# Patient Record
Sex: FEMALE | Race: WHITE | NOT HISPANIC OR LATINO | Employment: OTHER | URBAN - METROPOLITAN AREA
[De-identification: names, ages, dates, MRNs, and addresses within clinical notes are randomized per-mention and may not be internally consistent; named-entity substitution may affect disease eponyms.]

---

## 2017-01-05 DIAGNOSIS — G12.21 ALS (AMYOTROPHIC LATERAL SCLEROSIS): Primary | ICD-10-CM

## 2017-01-10 DIAGNOSIS — G12.21 ALS (AMYOTROPHIC LATERAL SCLEROSIS): Primary | ICD-10-CM

## 2017-01-11 DIAGNOSIS — G12.21 ALS (AMYOTROPHIC LATERAL SCLEROSIS): Primary | ICD-10-CM

## 2017-01-18 ENCOUNTER — OFFICE VISIT (OUTPATIENT)
Dept: NEUROLOGY | Facility: CLINIC | Age: 82
End: 2017-01-18
Payer: MEDICARE

## 2017-01-18 VITALS — HEIGHT: 65 IN | OXYGEN SATURATION: 98 %

## 2017-01-18 DIAGNOSIS — M41.9 SCOLIOSIS OF THORACIC SPINE, UNSPECIFIED SCOLIOSIS TYPE: ICD-10-CM

## 2017-01-18 DIAGNOSIS — Z78.9 IMPAIRED MOBILITY AND ADLS: ICD-10-CM

## 2017-01-18 DIAGNOSIS — Z00.8 NUTRITIONAL ASSESSMENT: ICD-10-CM

## 2017-01-18 DIAGNOSIS — G12.21 ALS (AMYOTROPHIC LATERAL SCLEROSIS): Primary | ICD-10-CM

## 2017-01-18 DIAGNOSIS — M54.50 CHRONIC MIDLINE LOW BACK PAIN WITHOUT SCIATICA: ICD-10-CM

## 2017-01-18 DIAGNOSIS — G89.29 CHRONIC MIDLINE LOW BACK PAIN WITHOUT SCIATICA: ICD-10-CM

## 2017-01-18 DIAGNOSIS — Z74.09 IMPAIRED MOBILITY AND ADLS: ICD-10-CM

## 2017-01-18 DIAGNOSIS — R09.82 POSTNASAL DRIP: ICD-10-CM

## 2017-01-18 PROCEDURE — 99212 OFFICE O/P EST SF 10 MIN: CPT

## 2017-01-18 PROCEDURE — 99213 OFFICE O/P EST LOW 20 MIN: CPT | Mod: S$PBB,,, | Performed by: PHYSICAL MEDICINE & REHABILITATION

## 2017-01-18 PROCEDURE — 92522 EVALUATE SPEECH PRODUCTION: CPT | Mod: PO

## 2017-01-18 PROCEDURE — G9186 MOTOR SPEECH GOAL STATUS: HCPCS | Mod: CI,PO

## 2017-01-18 PROCEDURE — 99215 OFFICE O/P EST HI 40 MIN: CPT | Mod: S$PBB,,, | Performed by: PSYCHIATRY & NEUROLOGY

## 2017-01-18 PROCEDURE — G9158 MOTOR SPEECH D/C STATUS: HCPCS | Mod: CI,PO

## 2017-01-18 PROCEDURE — G8999 MOTOR SPEECH CURRENT STATUS: HCPCS | Mod: CI,PO

## 2017-01-18 PROCEDURE — 99213 OFFICE O/P EST LOW 20 MIN: CPT | Mod: S$PBB,,, | Performed by: INTERNAL MEDICINE

## 2017-01-18 NOTE — PROGRESS NOTES
Physical Therapy Screen/Assessment at ALS clinic   Time In: 9:25AM  Time out: 9:40AM     Jaelyn is a 81 y.o. female that presents to [facility] secondary to dx of ALS. Pt was first diagnosed approximately 7 months ago, symptom onset > 1 year ago.      This patient is currently receiving home health PT so a new PT evaluation was not completed today. This is the 1st time seen at this ALS clinic. Pt's daughter Olga was present.     Pain: chronic back and LE pain. No rating provided.     Current functional level:  Mrs. Edmonds reports that her legs feel weaker and she is not able to walk unassisted anymore. She is only able to take a few steps even with assistance. She is being evaluated for a custom power WC by OT today. At home she is using a transport WC, not one she can propel on her own. She is using a BSC for toileting. Has sitters at night and home health to assist with bathing 3x/week. I recommend a sliding board to assist with transfer safety as she continues to get weaker. HHPT can train on the use.     Home setup: Lives with  who has his own medical deficits. 2 story home but bedroom, bath and all necessities on first level with 1 small step to enter.  Is planned to move into an assisted living facility in March.     Equipment/DME: Rollator, transport WC, BSC and tub bench for bathing.      Falls: None since last clinic.     Edema: B LEs, manages with elevation throughout the day.     Sensation: Intact    Tone: decreased B LE, R>L      Sitting balance static: good  Sitting balance dynamic: fair  Standing balance static: poor  Standing balance dynamic:  poor    Lower Extremity Strength   Right LE  Left LE    Hip flexion: 2/5 Hip flexion: 3/5   Hip abduction 2/5 Hip abduction 3/5   Hip adduction 2/5 Hip adduction 3/5   Knee extension: 2/5 Knee extension: 3/5   Knee flexion: 2/5 Knee flexion: 3/5   Ankle dorsiflexion: trace Ankle dorsiflexion: 2/5   Ankle PF 2/5 Ankle PF  3/5     Lower Extremity ROM: Passive WNL    HEP/Activity levels: Pt is working with a home health PT on HEP for AROM and PROM.     Recommendations:   Continue Home health PT.   Sliding board for transfers.  Patient will greatly benefit from a power WC - process initiated by OT today.     Rocío Guerrero, PT  01/18/2017

## 2017-01-18 NOTE — PROGRESS NOTES
CHIEF COMPLAINT:  OT Progress Note (w/c eval)    HISTORY OF PRESENT ILLNESS: Jaelyn Edmonds is a 81 y.o. female with ALS, history of breast cancer with local radiation, HTN who returns for followup of her motor neuron disease for evaluation of respiratory status. No infections or complications since her last visit. Notes progressive weakness of left leg (dz began in right leg). Taking cetirizine, nasal fluticasone, montelukast with some persistent sensation of post nasal drip/ congestion.     PAST MEDICAL HISTORY:    Past Medical History   Diagnosis Date    ALS (amyotrophic lateral sclerosis)     Breast cancer 2011     Breast cancer - lumpectomy, radiation, no chemo; L    Hypertension     Hypothyroid        PAST SURGICAL HISTORY:    Past Surgical History   Procedure Laterality Date    Spine surgery  2015     L4-5 laminectomy       FAMILY HISTORY:                Family History   Problem Relation Age of Onset    Cancer Mother     Cancer Father     Cancer Sister     Cancer Daughter        SOCIAL HISTORY:          Social support: Lives in [place]. presents with daughter Olga and close friend Megan (shon godmother-son had cervical diving injury recently) (another daughter Kerrie at last visit to [employer]). Her sister lives nearby.  with active illnesses- CAD stents recently.  Planning assisted living.  History   Smoking Status    Former Smoker    Years: 5.00   Smokeless Tobacco    Not on file       ALLERGIES:  Review of patient's allergies indicates:  No Known Allergies    CURRENT MEDICATIONS:    Current Outpatient Prescriptions   Medication Sig Dispense Refill    alprazolam (XANAX) 0.25 MG tablet Take 1 tablet (0.25 mg total) by mouth 2 (two) times daily as needed for Anxiety. 90 tablet 3    ARMOUR THYROID 30 mg Tab Take 30 mg by mouth once daily.  0    cetirizine (ZYRTEC) 10 MG tablet Take 1 tablet (10 mg total) by mouth once daily. 90 tablet 11    cyanocobalamin, vitamin B-12, 1,000 mcg/mL  Kit Inject 1,000 mcg as directed as directed. Inject 1000 mcg twice weekly 8 kit 5    lisinopril (PRINIVIL,ZESTRIL) 40 MG tablet Take 1 tablet (40 mg total) by mouth once daily. 90 tablet 11    magnesium 30 mg Tab Take by mouth.      montelukast (SINGULAIR) 10 mg tablet Take 1 tablet (10 mg total) by mouth after dinner. 90 tablet 11    multivitamin capsule Take 4 capsules by mouth once daily.      tramadol (ULTRAM) 50 mg tablet Take 1 tablet by mouth 3 times daily as needed for Pain. 90 tablet 1    triamterene-hydrochlorothiazide 37.5-25 mg (MAXZIDE-25) 37.5-25 mg per tablet Take 1 tablet by mouth once daily. 90 tablet 11    TURMERIC (CURCUMIN MISC) by Misc.(Non-Drug; Combo Route) route once daily.       No current facility-administered medications for this visit.                   REVIEW OF SYSTEMS:   Pulmonary related symptoms as per HPI.  Gen: no weight loss, no fever, no night sweats  HEENT: mild sinus congestion, no dysphagia, intermittent hoarseness  CV: no chest pain, no orthopnea (uses 2 pillows chronically for congestion), no paroxysmal nocturnal dyspnea  Neuro: no syncope, no falls, using walker with seat.  Sleep: No snoring; no witnessed apnea.         PHYSICAL EXAM:   There were no vitals filed for this visit.  RR 12  GENERAL: Alert, calm, in no distress  HEENT: Normal pupils, normal conjunctiva, normal EOM, nasal and oral mucosa normal, tongue normal  NECK: supple; no palpable lymphadenopathy or masses,no jugular venous distention.  CVS: regular rate and rhythm, no murmers, gallops or rubs  PULM: Grossly normal vital capacity, normal to percussion and palpation, clear to auscultation bilaterally with no wheezes, crackles or rhonchi, no accessory muscle usage.  ABDOMEN: soft nontender/nondistended,normal rise with inspiration, decreased contraction with cough  EXTREMITIES no cyanosis, clubbing, tr edema    CONTRIBUTORY STUDIES:     PULMONARY FUNCTION TESTS: FVC 90%; O2 sat 98%;   l/min      ACTIVE PULMONARY PROBLEMS:    ICD-10-CM ICD-9-CM    1. ALS (amyotrophic lateral sclerosis) G12.21 335.20    2. Impaired mobility and ADLs Z74.09 799.89    3. Chronic midline low back pain without sciatica M54.5 724.2     G89.29 338.29    4. Scoliosis of thoracic spine, unspecified scoliosis type M41.9 737.30        ASSESSMENT&PLAN:  1.  Persistent congestion / exacerbation- prn diphenhydramine at bedtime  2.  Inspiratory muscle strength intact  3.  Cough efficacy decreased by abdominal weakness.  Will prescribe cough assist machine.  Reviewed literature on exp muscle training- does not improve voluntary cough peak flow.      No Follow-up on file.      [Greater than 50% of this 25 minute visit spent counseling patient and family]

## 2017-01-18 NOTE — PROGRESS NOTES
Jaelyn Edmonds  [id number]  Shmuel    Diagnosis: ALS with impaired mobility, scoliosis, low back pain and B leg pain     Onset date: greater than 1 year ago but diagnosed 7 months ago.   Date of service: 1/18/17    Orders:  Power wheelchair eval    Subjective:  Patient goals: Safe and efficient use of a wheelchair for mobility in home and to preserve energy for daily life tasks. Prevention of skin breakdown.  Chief Complaint   Patient presents with    OT Progress Note     w/c eval         Past Medical History   Diagnosis Date    ALS (amyotrophic lateral sclerosis)     Breast cancer 2011     Breast cancer - lumpectomy, radiation, no chemo; L    Hypertension     Hypothyroid        Review of patient's allergies indicates:  No Known Allergies    Precautions: fall, fatigue  Social Hx: lives with  with caregivers in two story home with bed and bath on first floor. Pt. Is planning on moving to Assisted living facility in [place] at the end of March.     DME: transport w/c, rollator, rolling walker, BSC, and tub bench.  Home access: 1 step.     Past treatment includes: HH PT    Pain: back pain due to scoliosis. Pt. Described as chronic with no rating given.    Relieved by: medication    Dominant hand: right    Occupation/hobbies/homemaking: passive lifestyle. Tried to jovani recently and could not.    Driving status: D  Objective  Cognitive Exam  Oriented: person, place and time,  anxiety  Visual/perceptual: WNL    Physical Exam:  AROM/PROM,Strength and Coordination:    UE Pt. Has severe UE weakness. AROM WFL but fair plus strength, interossei atrophy B hands but R worse than L.  39lb R and 47 lb L.     LE  Poor 2/5 strength RLE and Fair plus 3 + in LLE. B foot drop present. No limits in PROM except dorsiflexion which is to neutral only. Moderate dependent edema in B feet with discoloration when not elevated.   Tone: Normal  Sensation: normal    Posture:   Trunk: sits in posterior pelvic tilt with  difficulty maintaining neutral pelvis. Severe scoliosis  Concave to L with slight R rib hump.   Head WNL    Skin integrity: WNL      Pt. Is in wheelchair 6 hours a day. Jaelyn Edmonds  Is not able to do pressure reliefs due to decreased arm and leg strength, back pain and compromised sit balance. Pt. Is at risk for prolonged sit. Sensation is intact and pt. Skin is intact.   Tone:  Modified Yakov Scale:   WNL  Balance:   Static Sit: weak trunk, pt. Sits in posterior tilt with dorsal lean.   Dynamic sit: poor, pt. Cannot safely lean forward and sideways.   Static Stand: m od a  Dynamic stand: KASEY Salinas, ATP from [employer] performed seating measures in my presence.  Functional Status:    Functional Mobility:  Bed mobility: min a  Roll to left:min a  Roll to right:  Min a  Supine to sit:  Min a  Sit to supine: min a  Transfers to bed: mod a -5 step to BSC with assistance of walker and 1 person.   Transfers to toilet: see t/f to bed. Same to t/f to shower bench.   Car transfers: Mod a  Wheelchair mobility: currently using transport w/c which does not provide independence in mobility or adequate trunk support.     ADL's:  Feeding: I  Grooming: mod I  Hygiene:mod I   UB Dressing: min a  LB Dressing: mod a  Toileting:  Mod a  Bathing:max a    IADL's:  Homecare: D  Cooking: D  Laundry: D  Yard work: n/a  Use of telephone:min a  Money management: min a  Medication management: min a  LEFS score: 4/80 or 95% impaired mobility    TODAY'S TREATMENT  Pt. evaluated for a power tilt and recline wheelchair with power elevating legs, power elevation,  protective cushion, and trunk support for independent mobility in the home using a alana stick  Power tilt and protective cushion- for pressure relief as she does not have UE, LE or trunk support to do pressure releases  Power recline - to change back/hip angle for decreased back pain due to decreased trunk strength  Power elevating legs - due to dependent edema in  BLE  Power elevate- to help with transfers to a variety of surfaces, to access sinks and countertops, and for social interactions.   Trunk support due to scoliosis and increased C curve with trunk weakness due to ALS.     ASSESSMENT:  OT diagnosis: Impaired ADL and mobility due to ALS, scoliosis and back pain  Assessment  Jaelyn Edmonds is a 81 y.o. with a medical diagnosis of ALS   referred to occupational therapy for power wheelchair postioning.     Patient has mobility limitation that significantly impairs safe, timely, consistent participation in one or more MRADL. Yes  A mobility assistive device will effectively improve ability to participate in or aid participation in MRADL's.  Yes   A cane or walker will provide patient the ability to safely and consistently perform MRADLs in a timely manner  no  The patient's home environment will support use of recommended mobility device. Yes   The patient has sufficient UE strength to effectively self propel a manual wheelchair for al MRADL's.  no  The patient has sufficient upper extremity strength, , transfer ability and trunk stability to safely operate a POV(scooter).  no  The additional benefits of a power wheelchair will more effectively enable MRADL's in home. Yes   The patient demonstrates ability/potential ability to use recommended equipment. Yes   The patient is willing and motivated to use the recommended equipment. Yes     Recommended Wheelchair will meet the following goals:  Improve mobility  Improve postural alignment  Decrease chance of injury;Improve safety  Decrease stress on pulmonary system  Improved endurance as a result of upright and midline positioning.  Provide trunk support.  Facilitate I in self care/home care.  Decrease pressure wounds or pressure on ischium/coccyx.    PLAN:   Patient/caregiver understands and agrees with plan of care.  OT to work with vendor, KARYN Womack from [employer] to obtain recommended wheelchair.

## 2017-01-18 NOTE — PROGRESS NOTES
Date: 01/18/2017    Start Time:  11:40  Stop Time:  12:00      ALS Clinic: 2      OUTPATIENT NEUROLOGICAL REHABILITATION  SPEECH THERAPY EVALUATION    HISTORY AND SUBJECTIVE    Onset Date:  Diagnosis 4 months ago. Symptoms about 1 year ago.  Primary Diagnosis:  ALS  Treatment Diagnosis:  Mild dysarthria and pharyngeal dysphagia  Referring Provider: Dr. Mi  Orders: Ambulatory referral for ST evaluate and treat  Past Medical History:   Past Medical History   Diagnosis Date    ALS (amyotrophic lateral sclerosis)     Breast cancer 2011     Breast cancer - lumpectomy, radiation, no chemo; L    Hypertension     Hypothyroid      History of Present Illness: Mrs. Edmonds presents to the [facility] secondary to the diagnosis of ALS Disease.   Functional Deficits Leading to Referral/Nature of Injury:  Hoarse vocal quality, mild decreased speech difficulty, and swallowing difficulty  Precautions:  Swallowing precautions   Prior Therapy:  Home Health speech therapy a few months for evaluation only. Currently receiving Home Health PT and Home Health OT.  Pain:  4-5/10 (leg pain when walking/standing)  Nutrition:  Regular diet with thin liquids with head turn to right  Environmental Concerns/Cultural/Spiritual/Developmental/Educational Needs: None  Social History:  Mrs. Marquez was accompanied to the clinic by her daughter. She lives at home with her  and has sitters during the day. They will be moving to an Assisted Living Facility in March.  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    OBJECTIVE  Current Assessment:   Auditory Comprehension: Not formally assessed. Appeared to be within functional limits for conversation.    Reading Comprehension: Not formally assessed. No concerns reported.    Verbal Expression: Not formally assessed. Appeared to be within functional limits for conversation. No word finding difficulties were observed.     Written Expression: Not formally  "assessed.    Cognition: Not formally assessed. Appeared to be within functional limits.    Motor Speech/Fluency/Voice: Oral motor exam revealed: strength and range were observed to be adequate for labial, lingual, mandibular, and buccal functions. Mild air escape when buccal muscles were expanded (puffed out) and mild tremors noted on tongue. Velum elevation was reduced for sustained and alternating elevation. Diadochokinetic (DDK) rates for rapid repetition of 1 - 3 syllable utterances were mildly reduced and articulation was mildly imprecise and uncoordinated. She sustained a vowel for an average of 17 seconds, with the norm being 15-25 seconds for her age and sex. Vocal quality was hoarse during the session even more so toward the end. Mrs. Edmonds and her daughter reported that her voice becomes hoarse with fatigue and extended use to the point of a whisper. Vocal intensity was low. Fluency was within normal limits. Mrs. Edmonds was intelligible most of the time during the evaluation.     NOTE: She was seen by Dr. Case, Otolaryngologist, on 9/25/16 and reported "mild allergic rhinitis, trace vocal fold atrophy, trace laryngeal tremor, trace muscle tension dysphonia, and impaired pharyngeal biomechanics with diminished sensation and weak pharyngeal squeeze."    Augmentative/Alternative Communication (AAC): Not warranted at this time. Declined voice banking. It was suggested that she use the Text to Speak communication ross and/or a voice amplifier when her vocal intensity and strength decline. Team Shree gave her a voice amplifier to use but she has not used it yet.     Swallowing: No concerns were reported today for regular consistency diet and thin liquids.    NOTE: Mrs. Edmonds participated in a Modified Barium Swallow Study (MBSS) on 08/10/16. See report for details. MBSS results indicated pharyngeal dysphagia on thin liquids. It was recommended that Mrs. Edmonds eat a regular consistency diet with thin " liquids given a right head turn. She reported no difficulty following swallowing precautions with current diet. She reported having difficulty swallowing large pills and needing large gulps of liquids to swallow with her head tilted back. She was educated to keep her head in an upright position and turned to the right while swallowing solids and liquids for further protection of airway.     Hearing: Not formally assessed. Appeared to be within functional limits for conversational speech.    Education: Mrs. Edmonds and her daughter were educated on recommendations, swallowing precautions, use of voice amplifier, communication ross (test to speak) and speech compensatory strategies. They verbalized understanding and agreed with the plan of care    ASSESSMENT  Impressions: Mrs. Edmonds exhibited mild dysarthria and pharyngeal dysphagia secondary to her diagnosis of ALS. Her deficits were characterized by mild oral motor weakness, reduced velar elevation, hoarse vocal quality, and low vocal intensity. She and her daughter reported increased hoarseness and consonant imprecision with fatigued. Mrs. Edmonds currently eats a regular consistency diet with thin liquids given a right head turn. No swallowing concerns were reported on her current diet. Some difficulty reported with large pills. No further speech therapy services are recommended at this time. Follow up with [facility] in three months.     Functional Communication Measure (FCM):   Severity Modifier for Medicare G-Code:  Motor Speech  Current status: FCM: Level 6   - CI  at least 1% but less than 20% impaired, limited or restricted  Projected status:  FCM:  Level 6    - CI at least 1% but less than 20% impaired, limited or restricted  Discharge status:  FCM:  Level 6   - CI at least 1% but less than 20% impaired, limited or restricted    [employer] National Outcome Measurement System (NOMS) and Functional Communication Measure (FCM):   Severity  Modifier for Medicare G-Code:  Swallowing  Current status:  FCM:  Level 6   - CI at least 1% but less than 20% impaired, limited or restricted  Projected status:  FCM: Level 6     - CI at least 1% but less than 20% impaired, limited or restricted  Discharge status:  FCM:  Level 6   -  CI at least 1% but less than 20% impaired, limited or restricted    PLAN  Recommended Treatment Plan: No speech therapy services at this time. Follow up with [facility] in 3 months.     Other Recommendations: None      DANIEL Lundberg, CCC-SLP  Speech-Language Pathologist  Outpatient Neurological Rehabilitation    Date: 01/18/2017

## 2017-01-18 NOTE — PROGRESS NOTES
"  Subjective:       Patient ID: Jaelyn Edmonds is a 81 y.o. female.    Chief Complaint:  ALS     History of Present Illness  HPI 1/18/2017  Jaelyn Edmonds is a 82 yo female with ALS, dx 7/2016, onset of right leg weakness 6/2015. Presents today for evaluation and mobility exam.  Pt was recently seen in neurology clinic by Dr. Mi and myself, see note below. She notes no major changes since seen in Neurology clinic. Power wheelchair eval done today. Continues to complain of chronic sore throat. Has some difficulty transferring from bed to wheelchair. She has plans to move into an assisted living facility in March with her .      Interval hx 12/20/16:  Jaelyn Edmonds is a 82 yo female with ALS dx 7/2016, onset of right leg weakness 6/2015. She presents today for a power wheelchair evaluation/mobility exam and to discuss some changes which have occurred in the interval since she was last seen.     She notes her lower extremities have progressively gotten weaker. She has a difficult time ambulating with the walker, mostly using a wheelchair. She has difficulty independently performing all ADLs. Currently getting home health PT twice weekly.     Also notes a slight change in breathing with exhaling. Denies sob or orthopnea. She notes hoarseness of her voice when tired. Pt has allergies, on Zyrtec and Flonase- notes chronic throat soreness when swallowing. She denies difficulty chewing or swallowing.       She is also here to discuss her future wishes and plans. She has the [employer] Wishes document which she will bring to us at ALS clinic next month. States she does not want a tracheostomy. She would like her body to be donated to science then later cremated. Her daughter, Olga, is her power of  and is aware of her mother's wishes.         Review of Systems  Review of Systems    Objective:     Visit Vitals    Ht 5' 5" (1.651 m)    SpO2 98%        Neurologic Exam    Physical Exam    ALS " Physical Exam PBA Speech Facial Strength Tongue Strength Tongue Appear GPS Jaw Jerk   1/18/2017 No normal normal normal fasic;normal No No      ALS Physical Exam (Continued) Limb Fasciculations Neck Flex HHD Neck Flex MMT Neck Ext HHD Neck Ext MMT Shd Abd R HHD Shd Abd R MMT   1/18/2017 Present 16.5 5 25 5 18.9 5      ALS Physical Exam (Continued) Shd Abd L HHD Shd Abd L MMT Wrist Ext R HHD Wrist Ext R MMT Wrist Ext L HHD Wrist Ext L MMT Hand  R (kg)   1/18/2017 15.6 4+ 14.8 5 14.5 5 18      ALS Physical Exam (Continued) Hand  L (kg) Hip Flex R HHD Hip Flex R MMT Hip Flex L HHD Hip Flex L MMT Knee Ext R HHD Knee Ext R MMT   1/18/2017 20 11.9 3 19 4 28.3 4+      ALS Physical Exam (Continued) Knee Ext L HHD Knee Ext L MMT Foot DF R HHD Foot DF R MMT Foot DF L HHD Foot DF L MMT UMN Signs Legs   1/18/2017 23.8 4+ 0 0 0 0 Yes      ALS Physical Exam (Continued) UMN Signs Legs Type   1/18/2017 increased dtr       Cognitive Score:  18   ALSFRS Score:  35   FRS-6 Score:  15                   Impression:        1. ALS (amyotrophic lateral sclerosis)    2. Impaired mobility and ADLs    3. Chronic midline low back pain without sciatica    4. Scoliosis of thoracic spine, unspecified scoliosis type    5. Postnasal drip    6. Nutritional assessment        Recommendations:     -This patient requires a power wheelchair for safe ambulation due to progressive upper and lower extremity weakness. Pt has progressive ambulation difficulties and is at risk for falls. Pt is unable to safely ambulate with a walker due to progressive upper extremity weakness.   -refer to PT/OT for face to face assessment for power wheelchair evaluation  -cough assist  -sliding board   -chronic sore throat- can do salt water gargles vs OTC Phenol (Ulcerease) rinse   -RTC 3 mos       I have personally taken the history and examined the patient and agree with the PA's note as stated above.    Cesario Mi MD, AMINA, FAAN  Department of Neurology    [facility] [place], [place]

## 2017-01-18 NOTE — PROGRESS NOTES
GRUPO met with pt, pt's dtr Olga and pt's friend, Vidya, on this date for pt's 2nd ALS clinic. Pt is a 80 y/o F with a primary dx of ALS. Pt states the info visit with Guardian Rupesh Rios went well and the hospice representative was able to answer all the family's questions. Pt inquired about brain and spinal cored donation- GRUPO provided them with the contact info of the [employer] at [facility] as well as all the forms they would need to complete in order to participate in their brain donation program. Pt also requested information on local cremation services which GRUPO will send to pt in the mail. Pt will be moving to an assisted living community in March. Pt has 3 sitters and aides that come in to her home to assist with ADLs and aides specifically top help with bathing 3x a week. GRUPO will continue to provide emotional support to pt and her family members and will connect them to community resources PRN.

## 2017-01-18 NOTE — PROGRESS NOTES
Subjective:       Patient ID: Jaelyn Edmonds is a 81 y.o. female.    Chief Complaint: OT Progress Note (w/c eval)    HPI     Ms. Edmonds is a 81-year-old white female with ALS diagnosed in July 2016, who   has followed up in the ALS Clinic.  Her past medical history is also significant   for hypertension, hypothyroidism, chronic low back pain secondary to DJD and   scoliosis.  Her last visit to the ALS Clinic was on 10/12/2016.  She was started   on meloxicam and p.r.n. tramadol for her back pain.    The patient is coming today to this clinic for followup.  Her back pain has been   generally under good control with occasional flare-up episodes.  It is an   intermittent aching pain in the lumbar spine and across her back.  She denies   any radiation into her legs.  Her maximum pain is 8-9/10 and minimum 1-2/10.    Today, it is 1-2/10.  The patient denies any bowel or bladder incontinence.  She   has some worsening of her lower extremity strength since last visit.    The patient was taking meloxicam, but had to stop it secondary to side effects   (abdominal pain, strange sensations in her tongue).  She continues to take   tramadol 50 mg p.r.n., usually one tablet three times per day.  She reports good   relief with its use.      MS/HN  dd: 01/18/2017 18:03:58 (CST)  td: 01/19/2017 08:35:03 (CST)  Doc ID   #[id number]  Job ID #[id number]    CC:           Review of Systems   Constitutional: Positive for fatigue.   Eyes: Negative for visual disturbance.   Respiratory: Negative for shortness of breath.    Cardiovascular: Negative for chest pain.   Gastrointestinal: Positive for constipation. Negative for nausea and vomiting.   Genitourinary: Positive for difficulty urinating.   Musculoskeletal: Positive for back pain and gait problem. Negative for arthralgias and neck pain.   Neurological: Positive for dizziness and headaches.   Psychiatric/Behavioral: Positive for sleep disturbance. Negative for behavioral problems.        Objective:      Physical Exam   Constitutional: She appears well-developed and well-nourished. No distress.   HENT:   Head: Normocephalic and atraumatic.   Neck: Normal range of motion.   Musculoskeletal:   BUE:  ROM:full.  Strength:    RUE: 5/5 at shoulder abduction, 5 elbow flexion, 5 elbow extension, 5 hand .   LUE: 5/5 at shoulder abduction, 5 elbow flexion, 5 elbow extension, 5 hand .  Sensation to pinprick:   RUE: intact.   LUE: intact.    BLE:  ROM:full.  Strength:    RLE: 3-/5 at hip flexion, 3 knee extension, 1 ankle DF, 3 PF.   LLE: 3/5 at hip flexion, 4 knee extension, 2 ankle DF, 3+PF.  Sensation to pinprick:     RLE: intact.      LLE: intact.     -ve tenderness over lumbar spine.         Neurological: She is alert.   Skin: Skin is warm.   Psychiatric: She has a normal mood and affect.   Vitals reviewed.      Assessment:       1. ALS (amyotrophic lateral sclerosis)    2. Impaired mobility and ADLs    3. Chronic midline low back pain without sciatica    4. Scoliosis of thoracic spine, unspecified scoliosis type        Plan:       - Continue tramadol (ULTRAM) 50 mg tablet; Take 1 tablet (50 mg total) by mouth 3 (three) times daily as needed for Pain.  - Follow up in ALS clinic.

## 2017-01-18 NOTE — PROGRESS NOTES
"Referring Physician:No ref. provider found     Reason for visit:  Chief Complaint   Patient presents with    OT Progress Note     w/c taeal    Nutrition Counseling        :1935     Allergies Reviewed  Meds Reviewed    Anthropometrics  Weight:   Height:5' 5" (1.651 m)  BMI:There is no height or weight on file to calculate BMI.   IBW:   56.8 kg    Meds:  Outpatient Medications Prior to Visit   Medication Sig Dispense Refill    alprazolam (XANAX) 0.25 MG tablet Take 1 tablet (0.25 mg total) by mouth 2 (two) times daily as needed for Anxiety. 90 tablet 3    ARMOUR THYROID 30 mg Tab Take 30 mg by mouth once daily.  0    cetirizine (ZYRTEC) 10 MG tablet Take 1 tablet (10 mg total) by mouth once daily. 90 tablet 11    cyanocobalamin, vitamin B-12, 1,000 mcg/mL Kit Inject 1,000 mcg as directed as directed. Inject 1000 mcg twice weekly 8 kit 5    lisinopril (PRINIVIL,ZESTRIL) 40 MG tablet Take 1 tablet (40 mg total) by mouth once daily. 90 tablet 11    magnesium 30 mg Tab Take by mouth.      montelukast (SINGULAIR) 10 mg tablet Take 1 tablet (10 mg total) by mouth after dinner. 90 tablet 11    multivitamin capsule Take 4 capsules by mouth once daily.      tramadol (ULTRAM) 50 mg tablet Take 1 tablet by mouth 3 times daily as needed for Pain. 90 tablet 1    triamterene-hydrochlorothiazide 37.5-25 mg (MAXZIDE-25) 37.5-25 mg per tablet Take 1 tablet by mouth once daily. 90 tablet 11    TURMERIC (CURCUMIN MISC) by Misc.(Non-Drug; Combo Route) route once daily.       No facility-administered medications prior to visit.          Labs:   N/A     Estimated Nutrition Needs:   1704 Kcals/day( 30 kcal/kg IBW),    46-56  g protein( 0.8-1.0 g/kg IBW)     Diet Hx: ALS Clinic - Initial Nutrition Assessment.  Seen today in conjunction with SLP.  Pt, her daughter and friend present for encounter.  Pt in clinic wheelchair; today's wt = 76.5 kg (pt + wc)  Pt's usual weight is 130 lbs.  Pt has help with grocery shopping and " meal preparation.  Eats 3 meals/day, plus one generic brand liquid nutrition supplement daily.  Appetite is fair; having some difficulty swallowing.     Assessment:   Note pt did not  copy of handouts she requested back in August; those were provided today.  Pt did not voice any nutrition questions/concerns today.    Nutrition Diagnosis: none at this time.    Recommendations:   Continue diet as tolerated.  Handouts provided:  High-Calorie, High-Protein Nutrition Therapy; Food Choices to Add Calories and Protein; Suggestions for Increasing Calories and Protein; High-Calorie, High-Protein Recipes; High-Calorie, High-Protein Shopping & Cooking Tips      Consultation Time:15 minutes.    Follow Up: Pt provided with dietitian contact number and advised to call with questions or make future appointment if further intervention needed.

## 2017-01-24 ENCOUNTER — TELEPHONE (OUTPATIENT)
Dept: NEUROLOGY | Facility: CLINIC | Age: 82
End: 2017-01-24

## 2017-01-24 DIAGNOSIS — G12.21 ALS (AMYOTROPHIC LATERAL SCLEROSIS): Primary | ICD-10-CM

## 2017-01-24 NOTE — TELEPHONE ENCOUNTER
----- Message from Felton Elder sent at 1/24/2017  1:55 PM CST -----  Contact: PT  States she need the information to the pulmonologist that was dicussed. She is having problems with her breathing, and would like a call today.    She states she is unable to find the information.     Please call: [phone]

## 2017-01-24 NOTE — TELEPHONE ENCOUNTER
Patient c/o bloating and difficulty exhaling. Cough assist delivered by [employer] on 1/23/17. Patient stated used device once on yesterday setting #2. Dr. Mi recommends additional training on proper use of Cough Assist. Nurse spoke with RT Fran of [employer], stated will contact patient and complete training visit.

## 2017-03-13 ENCOUNTER — PATIENT MESSAGE (OUTPATIENT)
Dept: NEUROLOGY | Facility: CLINIC | Age: 82
End: 2017-03-13

## 2017-03-14 DIAGNOSIS — G12.21 ALS (AMYOTROPHIC LATERAL SCLEROSIS): Primary | ICD-10-CM

## 2017-03-15 ENCOUNTER — OFFICE VISIT (OUTPATIENT)
Dept: NEUROLOGY | Facility: CLINIC | Age: 82
End: 2017-03-15
Payer: MEDICARE

## 2017-03-15 ENCOUNTER — LAB VISIT (OUTPATIENT)
Dept: LAB | Facility: HOSPITAL | Age: 82
End: 2017-03-15
Attending: PSYCHIATRY & NEUROLOGY
Payer: MEDICARE

## 2017-03-15 VITALS
HEIGHT: 65 IN | OXYGEN SATURATION: 100 % | BODY MASS INDEX: 41.54 KG/M2 | SYSTOLIC BLOOD PRESSURE: 112 MMHG | DIASTOLIC BLOOD PRESSURE: 66 MMHG | WEIGHT: 249.31 LBS

## 2017-03-15 DIAGNOSIS — G12.21 ALS (AMYOTROPHIC LATERAL SCLEROSIS): Primary | ICD-10-CM

## 2017-03-15 DIAGNOSIS — M54.50 CHRONIC MIDLINE LOW BACK PAIN WITHOUT SCIATICA: ICD-10-CM

## 2017-03-15 DIAGNOSIS — G12.21 ALS (AMYOTROPHIC LATERAL SCLEROSIS): ICD-10-CM

## 2017-03-15 DIAGNOSIS — R49.0 DYSPHONIA: ICD-10-CM

## 2017-03-15 DIAGNOSIS — Z74.09 IMPAIRED MOBILITY AND ADLS: ICD-10-CM

## 2017-03-15 DIAGNOSIS — G89.29 CHRONIC MIDLINE LOW BACK PAIN WITHOUT SCIATICA: ICD-10-CM

## 2017-03-15 DIAGNOSIS — R47.1 DYSARTHRIA: ICD-10-CM

## 2017-03-15 DIAGNOSIS — Z78.9 IMPAIRED MOBILITY AND ADLS: ICD-10-CM

## 2017-03-15 DIAGNOSIS — M41.9 SCOLIOSIS OF THORACIC SPINE, UNSPECIFIED SCOLIOSIS TYPE: ICD-10-CM

## 2017-03-15 DIAGNOSIS — R49.9 VOICE DISTURBANCE: ICD-10-CM

## 2017-03-15 DIAGNOSIS — R60.9 EDEMA, UNSPECIFIED TYPE: ICD-10-CM

## 2017-03-15 PROCEDURE — G9158 MOTOR SPEECH D/C STATUS: HCPCS | Mod: CI,PO

## 2017-03-15 PROCEDURE — 92522 EVALUATE SPEECH PRODUCTION: CPT | Mod: PO

## 2017-03-15 PROCEDURE — G8996 SWALLOW CURRENT STATUS: HCPCS | Mod: CI,PO

## 2017-03-15 PROCEDURE — G9186 MOTOR SPEECH GOAL STATUS: HCPCS | Mod: CI,PO

## 2017-03-15 PROCEDURE — 99215 OFFICE O/P EST HI 40 MIN: CPT | Mod: S$PBB,,, | Performed by: PSYCHIATRY & NEUROLOGY

## 2017-03-15 PROCEDURE — 36415 COLL VENOUS BLD VENIPUNCTURE: CPT

## 2017-03-15 PROCEDURE — 99213 OFFICE O/P EST LOW 20 MIN: CPT

## 2017-03-15 PROCEDURE — 86480 TB TEST CELL IMMUN MEASURE: CPT

## 2017-03-15 PROCEDURE — G8999 MOTOR SPEECH CURRENT STATUS: HCPCS | Mod: CI,PO

## 2017-03-15 PROCEDURE — 92610 EVALUATE SWALLOWING FUNCTION: CPT | Mod: PO

## 2017-03-15 PROCEDURE — G8998 SWALLOW D/C STATUS: HCPCS | Mod: CI,PO

## 2017-03-15 PROCEDURE — G8997 SWALLOW GOAL STATUS: HCPCS | Mod: CI,PO

## 2017-03-15 PROCEDURE — 99213 OFFICE O/P EST LOW 20 MIN: CPT | Mod: S$PBB,,, | Performed by: PHYSICAL MEDICINE & REHABILITATION

## 2017-03-15 PROCEDURE — 99213 OFFICE O/P EST LOW 20 MIN: CPT | Mod: S$PBB,,, | Performed by: INTERNAL MEDICINE

## 2017-03-15 NOTE — PROGRESS NOTES
Subjective:       Patient ID: Jaelyn Edmonds is a 81 y.o. female.    Chief Complaint: No chief complaint on file.    HPI     HISTORY OF PRESENT ILLNESS:  Mrs. Edmonds is a 81-year-old white female with ALS   diagnosed in 06/2016.  Her past medical history is also significant for   hypertension, hypothyroidism, chronic low back pain secondary to DJD and   scoliosis.  She is followed up in the last clinic in four months participate in   the care.  Her last visit to the clinic was on 01/18/2017.    The patient is coming today to the clinic for followup.  Her back pain has been   waxing and waning.  It is an intermittent aching pain in the lumbar spine.  She   denies any radiation to her legs.  It is aggravated mostly by standing or   walking.  It is relieved by sitting down or lying down.  Her maximum pain is   8-9/10, although this is short lasting, only for few minutes.  Her minimum pain   is 2-3/10.  Today, it is 2-3/10.  The patient reports some subjective worsening   of her lower extremity strength.  She denies any bowel or bladder incontinence.    She has been taking ibuprofen 200 mg, 2-3 tablets once per day.  She was taking   tramadol 50 mg p.r.n., usually three times per day.  However, it made her   constipation worse, so she lowered the dose to once daily as needed.  She   reports that Tylenol makes her dizzy.  Meloxicam in the past caused vision and   breathing problems.  She is intolerant to codeine.      MS/HN  dd: 03/15/2017 10:00:55 (CDT)  td: 03/16/2017 02:38:23 (CDT)  Doc ID   #[id number]  Job ID #[id number]    CC:         Review of Systems   Constitutional: Positive for fatigue.   Eyes: Negative for visual disturbance.   Respiratory: Positive for shortness of breath.    Cardiovascular: Negative for chest pain.   Gastrointestinal: Positive for constipation. Negative for blood in stool, nausea and vomiting.   Genitourinary: Negative for difficulty urinating.   Musculoskeletal: Positive for back pain, gait  problem and neck pain. Negative for arthralgias.   Neurological: Positive for dizziness and headaches.   Psychiatric/Behavioral: Positive for sleep disturbance. Negative for behavioral problems.       Objective:      Physical Exam   Constitutional: She appears well-developed and well-nourished. No distress.   HENT:   Head: Normocephalic and atraumatic.   Neck: Normal range of motion.   Musculoskeletal:   BUE:  ROM:full.  Strength:    RUE: 5/5 at shoulder abduction, 5 elbow flexion, 5 elbow extension, 5 hand .   LUE: 5/5 at shoulder abduction, 5 elbow flexion, 5 elbow extension, 5 hand .  Sensation to pinprick:   RUE: intact.   LUE: intact.    BLE:  ROM:full.  Strength:    RLE: 2/5 at hip flexion, 3 knee extension, 1 ankle DF, 3 PF.   LLE: 3-/5 at hip flexion, 3 knee extension, 1 ankle DF, 3 PF.  Sensation to pinprick:     RLE: intact.      LLE: intact.     Mild tenderness over lumbar spine.         Neurological: She is alert.   Skin: Skin is warm.   Psychiatric: She has a normal mood and affect.   Vitals reviewed.      Assessment:       1. ALS (amyotrophic lateral sclerosis)    2. Impaired mobility and ADLs    3. Chronic midline low back pain without sciatica    4. Scoliosis of thoracic spine, unspecified scoliosis type        Plan:     - Continue ibuprofen 200 mg, 1-2 tablets (or equivalent in liquid form) 2-3 times per day as needed for mild to moderate pain.  - Continue tramadol (ULTRAM) 50 mg tablet; Take 1 tablet (50 mg total) by mouth once daily as needed for severe pain.  - The patient may call if she needs something stronger (she cannot tolerate tylenol or codeine).  - Follow up in ALS clinic.

## 2017-03-15 NOTE — PROGRESS NOTES
CHIEF COMPLAINT:  No chief complaint on file.    HISTORY OF PRESENT ILLNESS: Jaelyn Edmonds is a 81 y.o. female with ALS, history of breast cancer with local radiation, HTN who returns for followup of her motor neuron disease for evaluation of respiratory status. No infections or complications since her last visit. No longer ambulatory. Taking cetirizine, nasal fluticasone, montelukast with some persistent sensation of post nasal drip/ congestion. Not tolerating cough assist at current settings.  Dry mouth.          PAST MEDICAL HISTORY:    Past Medical History:   Diagnosis Date    ALS (amyotrophic lateral sclerosis)     Breast cancer 2011    Breast cancer - lumpectomy, radiation, no chemo; L    Hypertension     Hypothyroid        PAST SURGICAL HISTORY:    Past Surgical History:   Procedure Laterality Date    SPINE SURGERY  2015    L4-5 laminectomy       FAMILY HISTORY:                Family History   Problem Relation Age of Onset    Cancer Mother     Cancer Father     Cancer Sister     Cancer Daughter        SOCIAL HISTORY:          Social support: Lives in [place]. presents with daughter Olga and close friend Megan (shon godmother-son had cervical diving injury recently) ( daughter-in-law Kerrie here today). Her sister lives nearby.  with active illnesses- CAD stents recently. Planning assisted living in April 2017.  History   Smoking Status    Former Smoker    Years: 5.00   Smokeless Tobacco    Not on file       ALLERGIES:  Review of patient's allergies indicates:  No Known Allergies    CURRENT MEDICATIONS:    Current Outpatient Prescriptions   Medication Sig Dispense Refill    alprazolam (XANAX) 0.25 MG tablet Take 1 tablet (0.25 mg total) by mouth 2 (two) times daily as needed for Anxiety. 90 tablet 3    ARMOUR THYROID 30 mg Tab Take 30 mg by mouth once daily.  0    cetirizine (ZYRTEC) 10 MG tablet Take 1 tablet (10 mg total) by mouth once daily. 90 tablet 11    cyanocobalamin,  "vitamin B-12, 1,000 mcg/mL Kit Inject 1,000 mcg as directed as directed. Inject 1000 mcg twice weekly 8 kit 5    lisinopril (PRINIVIL,ZESTRIL) 40 MG tablet Take 1 tablet (40 mg total) by mouth once daily. 90 tablet 11    magnesium 30 mg Tab Take by mouth.      montelukast (SINGULAIR) 10 mg tablet Take 1 tablet (10 mg total) by mouth after dinner. 90 tablet 11    multivitamin capsule Take 4 capsules by mouth once daily.      tramadol (ULTRAM) 50 mg tablet Take 1 tablet by mouth 3 times daily as needed for Pain. (Patient taking differently: Take 1 tablet by mouth 1 times daily as needed for Pain.) 90 tablet 1    triamterene-hydrochlorothiazide 37.5-25 mg (MAXZIDE-25) 37.5-25 mg per tablet Take 1 tablet by mouth once daily. 90 tablet 11    TURMERIC (CURCUMIN MISC) by Misc.(Non-Drug; Combo Route) route once daily.       No current facility-administered medications for this visit.                   REVIEW OF SYSTEMS:   Pulmonary related symptoms as per HPI.  Gen: no weight loss, no fever, no night sweats  HEENT: mild sinus congestion, no dysphagia, more persistent hoarseness  CV: no chest pain, no orthopnea (uses 2 pillows chronically for congestion), no paroxysmal nocturnal dyspnea  Neuro: no syncope, no falls, not walking  PHYSICAL EXAM:   Vitals:    03/15/17 0812   BP: 112/66   Weight: 113.1 kg (249 lb 5.4 oz)   Height: 5' 5" (1.651 m)   PainSc:   4   PainLoc: Back     There were no vitals filed for this visit.  RR 12  GENERAL: Alert, calm, in no distress  HEENT: Normal pupils, normal conjunctiva, normal EOM, nasal and oral mucosa normal, tongue normal, mild coating  NECK: supple; no palpable lymphadenopathy or masses,no jugular venous distention.  CVS: regular rate and rhythm, no murmers, gallops or rubs  PULM: Grossly normal vital capacity, normal to percussion and palpation, clear to auscultation bilaterally with no wheezes, crackles or rhonchi, no accessory muscle usage.  ABDOMEN: soft " nontender/nondistended,normal rise with inspiration, decreased contraction with cough  EXTREMITIES no cyanosis, clubbing, tr edema on right ankle    CONTRIBUTORY STUDIES:     PULMONARY FUNCTION TESTS: FVC 85%   l/m, o2 sat 100%        ACTIVE PULMONARY PROBLEMS:    ICD-10-CM ICD-9-CM    1. ALS (amyotrophic lateral sclerosis) G12.21 335.20 CBC W/ AUTO DIFFERENTIAL      COMPREHENSIVE METABOLIC PANEL   2. Impaired mobility and ADLs Z74.09 799.89 CBC W/ AUTO DIFFERENTIAL      COMPREHENSIVE METABOLIC PANEL   3. Chronic midline low back pain without sciatica M54.5 724.2 CBC W/ AUTO DIFFERENTIAL    G89.29 338.29 COMPREHENSIVE METABOLIC PANEL   4. Scoliosis of thoracic spine, unspecified scoliosis type M41.9 737.30 CBC W/ AUTO DIFFERENTIAL      COMPREHENSIVE METABOLIC PANEL       ASSESSMENT&PLAN:  1.  Cough assist instruction- reviewed and recommend increasing inspiratory time from 1.5 to 2-3 sec by RT  2.  Increased risk for venous thromboembolic dz / pt wishes to defer US and treatment until mid April 2017- her new assisted living is near [facility] [place].      No Follow-up on file.      [Greater than 50% of this 30 minute visit spent counseling patient and family]

## 2017-03-15 NOTE — PROGRESS NOTES
Pt declined OT screen at clinic this date. Pt expressed need for HH OT to other providers and this was addressed with MD in staffing.     FE Stewart

## 2017-03-15 NOTE — TELEPHONE ENCOUNTER
Patient seen in ALS Clinic today, RN ordered TB Gold Serum test per Dr. Mi. GO De La Torre discussed test with patient and informed lab to be completed after clinic on today, 3/15/17.

## 2017-03-15 NOTE — PROGRESS NOTES
"  Subjective:       Patient ID: Jaelyn Edmonds is a 81 y.o. female.    Chief Complaint:  ALS    History of Present Illness  HPI 3/15/17  Jaelyn Edmonds is a 82 yo female with ALS, symptom onset RLE 6/2015, dx 7/2016  Getting much more CARUSO since last visit. No PND. No AM headaches.     Has been anorexic, but forcing herself to eat.     Also having a lot of bloating, which makes it difficult to breath. Gas-x helps - taking it 2-3x week. Having a bowel movement every other day. Taking sinekot laxative. Tried metamucil, but causes more bloating. Taking in four 'big glasses' of water or gatorade a day.     Weaning tramadol - down to one a day.     Not standing at all anymore.     Will be moving into assisted living facility - first week of April. [facility] on [address] in [place] [facility]. Facility allows hospice and private sitters. Not sure about PEG tube. She's not certain that she wants a PEG. Doesn't want to prolong it.     She began taking sublingual CBD Oil on 3/10/17 to see if helps with pain, sleep, and perhaps appetite. She is sleeping better.     She provided me with an informational packet for CDB oil.     Working on brain and spinal cord donation to [facility] and [facility] will receive the remainder.         Review of Systems  Review of Systems    Objective:      Neurologic Exam  /66  Ht 5' 5" (1.651 m)  Wt 113.1 kg (249 lb 5.4 oz)  SpO2 100%  BMI 41.49 kg/m2  FVC today: 85%    Physical Exam    ALS Physical Exam PBA Speech Facial Strength Tongue Strength Tongue Appear GPS Jaw Jerk   3/15/2017 No mild normal normal normal;fasic No No      ALS Physical Exam (Continued) Limb Fasciculations Neck Flex HHD Neck Flex MMT Neck Ext HHD Neck Ext MMT Shd Abd R HHD Shd Abd R MMT   3/15/2017 Present 16.7 4+ 19.5 5 20.4 4+      ALS Physical Exam (Continued) Shd Abd L HHD Shd Abd L MMT Wrist Ext R HHD Wrist Ext R MMT Wrist Ext L HHD Wrist Ext L MMT Hand  R (kg)   3/15/2017 14.9 4+ 14.3 5 " 13.3 5 14      ALS Physical Exam (Continued) Hand  L (kg) Hip Flex R HHD Hip Flex R MMT Hip Flex L HHD Hip Flex L MMT Knee Ext R HHD Knee Ext R MMT   3/15/2017 20 0 1 0 1 22.8 4      ALS Physical Exam (Continued) Knee Ext L HHD Knee Ext L MMT Foot DF R HHD Foot DF R MMT Foot DF L HHD Foot DF L MMT UMN Signs Arms Type   3/15/2017 24.1 4 0 0 0 0 increased dtrs      ALS Physical Exam (Continued) UMN Signs Legs UMN Signs Legs Type   3/15/2017 Yes increased dtrs       Cognitive Score:  20   ALSFRS Score:  25   FRS-6 Score:  8                   Impression:        1. ALS (amyotrophic lateral sclerosis)    2. Impaired mobility and ADLs    3. Chronic midline low back pain without sciatica    4. Scoliosis of thoracic spine, unspecified scoliosis type    5. Dysphonia    6. Voice disturbance    7. Dysarthria           Recommendations:       Doing well.   Will obtain TB test today  Will need to go on xeralto for dvt prophylaxis  Will need LE doppler prior to xeralto  Will check cbc and chem panel today for fatigue  Will ask Angelique to reach out to patient re: biobanking at [facility] for OT and social work, pt already receiving home health PT     Cesario Mi MD, AMINA, FAAN  Department of Neurology   [facility] [place], [place]

## 2017-03-15 NOTE — MR AVS SNAPSHOT
[facility]  [address]  [facility] [address]  Phone: [phone]  Fax: [phone]                  Jaelyn Edmonds   3/15/2017 8:00 AM   Office Visit    Description:  Female : 1935   Provider:  ALS, CLINIC   Department:  [facility]           Diagnoses this Visit        Comments    ALS (amyotrophic lateral sclerosis)    -  Primary     Impaired mobility and ADLs         Chronic midline low back pain without sciatica         Scoliosis of thoracic spine, unspecified scoliosis type                To Do List           Future Appointments        Provider Department Dept Phone    3/15/2017 12:00 PM LAB, APPOINTMENT [facility] [phone]    3/15/2017 12:10 PM LAB, APPOINTMENT [facility] [phone]    3/23/2017 12:00 PM Ismael Sanchez MD [facility] [phone]      Goals (5 Years of Data)     None      [facility]     [facility] -  Assistance  Registered nurses in the [facility] provide clinical advisement, health education, appointment booking, and other advisory services.  Call for this free service at [phone].             Medications           Message regarding Medications     Verify the changes and/or additions to your medication regime listed below are the same as discussed with your clinician today.  If any of these changes or additions are incorrect, please notify your healthcare provider.             Verify that the below list of medications is an accurate representation of the medications you are currently taking.  If none reported, the list may be blank. If incorrect, please contact your healthcare provider. Carry this list with you in case of emergency.           Current Medications     alprazolam (XANAX) 0.25 MG tablet Take 1 tablet (0.25 mg total) by mouth 2 (two) times daily as needed for Anxiety.    ARMOUR THYROID 30 mg Tab  "Take 30 mg by mouth once daily.    cetirizine (ZYRTEC) 10 MG tablet Take 1 tablet (10 mg total) by mouth once daily.    cyanocobalamin, vitamin B-12, 1,000 mcg/mL Kit Inject 1,000 mcg as directed as directed. Inject 1000 mcg twice weekly    lisinopril (PRINIVIL,ZESTRIL) 40 MG tablet Take 1 tablet (40 mg total) by mouth once daily.    magnesium 30 mg Tab Take by mouth.    montelukast (SINGULAIR) 10 mg tablet Take 1 tablet (10 mg total) by mouth after dinner.    multivitamin capsule Take 4 capsules by mouth once daily.    tramadol (ULTRAM) 50 mg tablet Take 1 tablet by mouth 3 times daily as needed for Pain.    triamterene-hydrochlorothiazide 37.5-25 mg (MAXZIDE-25) 37.5-25 mg per tablet Take 1 tablet by mouth once daily.    TURMERIC (CURCUMIN MISC) by Misc.(Non-Drug; Combo Route) route once daily.           Clinical Reference Information           Your Vitals Were     BP Height Weight BMI       112/66 5' 5" (1.651 m) 113.1 kg (249 lb 5.4 oz) 41.49 kg/m2       Blood Pressure          Most Recent Value    BP  112/66      Allergies as of 3/15/2017     No Known Allergies      Immunizations Administered on Date of Encounter - 3/15/2017     None      Orders Placed During Today's Visit     Future Labs/Procedures Expected by Expires    CBC W/ AUTO DIFFERENTIAL  3/15/2017 5/14/2018    COMPREHENSIVE METABOLIC PANEL  3/15/2017 5/14/2018      Language Assistance Services     ATTENTION: Language assistance services are available, free of charge. Please call [phone].      ATENCIÓN: Si jessenia osei, tiene a barrios disposición servicios gratuitos de asistencia lingüística. Llame al [phone].     JUSITN Ý: N?u b?n nói Ti?ng Vi?t, có các d?ch v? h? tr? ngôn ng? mi?n phí dành cho b?n. G?i s? [phone].         [facility] complies with applicable Federal civil rights laws and does not discriminate on the basis of race, color, national origin, age, disability, or sex.        "

## 2017-03-15 NOTE — PROGRESS NOTES
Date: 03/15/2017    Start Time: 1020  Stop Time:  1045      ALS Clinic: 3      OUTPATIENT NEUROLOGICAL REHABILITATION  SPEECH THERAPY EVALUATION    HISTORY AND SUBJECTIVE    Onset Date: Symptoms about 1 year ago.  Primary Diagnosis:  ALS  Treatment Diagnosis:  Mild dysarthria and pharyngeal dysphagia  Referring Provider: Dr. Mi  Orders: Ambulatory referral for ST evaluate and treat  Past Medical History:   Past Medical History:   Diagnosis Date    ALS (amyotrophic lateral sclerosis)     Breast cancer 2011    Breast cancer - lumpectomy, radiation, no chemo; L    Hypertension     Hypothyroid      History of Present Illness: Mrs. Edmonds presents to the [facility] secondary to the diagnosis of ALS Disease.   Functional Deficits Leading to Referral/Nature of Injury:  Hoarse vocal quality, mild decreased speech difficulty, and swallowing difficulty  Precautions:  Swallowing precautions   Prior Therapy:  Home Health speech therapy a few months for evaluation only. Currently receiving Home Health PT and Home Health OT.  Pain:  2/10, pain in throat  Nutrition:  Regular consistency diet with thin liquids. She reported that she has a decreased appetite at this time.   Environmental Concerns/Cultural/Spiritual/Developmental/Educational Needs: None  Social History:  Mrs. Marquez was accompanied to the clinic by her daughter in law. She lives at home with her  and has sitters during the day. They will be moving to an Assisted Living Facility soon.  Mrs. Edmonds was singing in choirs but it is unknown if she does at this time.    OBJECTIVE  Current Assessment:   Auditory Comprehension: Not formally assessed. Appeared to be within functional limits for conversation.    Reading Comprehension: Not formally assessed. No concerns reported.    Verbal Expression: Not formally assessed. Appeared to be within functional limits for conversation. No word finding difficulties  "were observed.     Written Expression: Not formally assessed.    Cognition: Not formally assessed. Appeared to be within functional limits.    Motor Speech/Fluency/Voice: Oral motor exam revealed: strength and range were observed to be mildly reduced for labial, lingual, mandibular, and buccal functions. Mild lingual tremors were noted. Velum elevation was reduced for sustained and alternating elevation. Diadochokinetic (DDK) rates for rapid repetition of 1 - 3 syllable utterances were mildly reduced and articulation was mildly imprecise and uncoordinated. She sustained a vowel for an average of 12.3 seconds, which is below the norm being 15-25 seconds for her age and sex. Vocal quality was hoarse during the session. Decreased breath and speech coordination was noted. Mrs. Edmonds reported that it hurts to amplify her voice and she gargles with coconut oil to soothe her throat. Hot tea such as herbal teas or "Throat coat" tea was recommended to aid in soothing her throat if cleared by her physician. Vocal intensity was low. Mrs. Edmonds has a voice amplifier at home but has not used it yet. She was encouraged to use it to help with vocal intensity.  Fluency was within normal limits. Mrs. Edmonds was intelligible most of the time during the evaluation. She uses Cough Assist at this time to aid in respiration.      NOTE: She was seen by Dr. Case, Otolaryngologist, on 9/25/16 and reported "mild allergic rhinitis, trace vocal fold atrophy, trace laryngeal tremor, trace muscle tension dysphonia, and impaired pharyngeal biomechanics with diminished sensation and weak pharyngeal squeeze."    Augmentative/Alternative Communication (AAC): Not warranted at this time. Declined voice banking. It was suggested that she use the Text to Speak communication ross and/or a voice amplifier when her vocal intensity and strength decline. She has a voice amplifier to use but she has not used it yet. A boogie board and text to speak ross " for the phone and ipad were recommended.    Swallowing: No concerns were reported today for regular consistency diet and thin liquids.    NOTE: Mrs. Edmonds participated in a Modified Barium Swallow Study (MBSS) on 08/10/16. See report for details. MBSS results indicated pharyngeal dysphagia on thin liquids. It was recommended that Mrs. Edmonds eat a regular consistency diet with thin liquids given a right head turn. She reported no difficulty following swallowing precautions with current diet. She reported having difficulty swallowing large pills and needing large gulps of liquids to swallow with her head tilted back. She was educated to keep her head in an upright position and turned to the right while swallowing solids and liquids for further protection of airway.     Hearing: Not formally assessed. Appeared to be within functional limits for conversational speech.    Education: Mrs. Edmonds and her daughter were educated on recommendations, swallowing precautions, use of voice amplifier, communication ross (text to speak) and speech compensatory strategies. They verbalized understanding and agreed with the plan of care    ASSESSMENT  Impressions: Mrs. Edmonds exhibited mild dysarthria and pharyngeal dysphagia secondary to her diagnosis of ALS. Her deficits were characterized by mild oral motor weakness, reduced velar elevation, hoarse vocal quality, and low vocal intensity. She reported increased hoarseness and consonant imprecision with fatigued. Mrs. Edmonds currently eats a regular consistency diet with thin liquids given a right head turn. No swallowing concerns were reported on her current diet. Some difficulty reported with large pills. No further speech therapy services are recommended at this time. Follow up with [facility] in three months.     Functional Communication Measure (FCM):   Severity Modifier for Medicare G-Code:  Motor Speech  Current status: FCM: Level 6   - CI  at least 1% but  less than 20% impaired, limited or restricted  Projected status:  FCM:  Level 6    - CI at least 1% but less than 20% impaired, limited or restricted  Discharge status:  FCM:  Level 6   - CI at least 1% but less than 20% impaired, limited or restricted    [employer] National Outcome Measurement System (NOMS) and Functional Communication Measure (FCM):   Severity Modifier for Medicare G-Code:  Swallowing  Current status:  FCM:  Level 6   - CI at least 1% but less than 20% impaired, limited or restricted  Projected status:  FCM: Level 6     - CI at least 1% but less than 20% impaired, limited or restricted  Discharge status:  FCM:  Level 6   -  CI at least 1% but less than 20% impaired, limited or restricted    PLAN  Recommended Treatment Plan: No speech therapy services at this time. Follow up with [facility] in 3 months.     Other Recommendations: Use voice amplifier to increase vocal intensity.      SIMONE Arroyo   Clinician     I certify that I was present in the room directing the student in service delivery and guiding them using my skilled judgment. As the co-signing therapist I have reviewed the students documentation and am responsible for the treatment, assessment, and plan.    DANIEL Lundberg, CCC-SLP  Speech-Language Pathologist  Outpatient Neurological Rehabilitation      Date: 03/15/2017

## 2017-03-16 ENCOUNTER — PATIENT MESSAGE (OUTPATIENT)
Dept: NEUROLOGY | Facility: CLINIC | Age: 82
End: 2017-03-16

## 2017-03-17 ENCOUNTER — TELEPHONE (OUTPATIENT)
Dept: NEUROLOGY | Facility: CLINIC | Age: 82
End: 2017-03-17

## 2017-03-17 NOTE — TELEPHONE ENCOUNTER
Spoke with patient and daughter-in-law, Kerrie, regarding rules of the assisted living facility that Mrs. Christy plans to move to. The major concern is if the facility is compatible with a PEG and/or Trilogy. According to the email Kerrie sent, the facility does not allow nasogastric tubes or dependency on a positive airway pressure device without the ability to either self-administer or have a third party administer at all times. I discussed this with both Kerrie and Mrs. Christy. Kerrie mentioned she is not sure if the facility meant to be more broad when using the term nasogastric tube to also include PEG so she will inquire more specifically at the upcoming open [place]. We discussed the Trilogy, I informed them she would be able to have a Trilogy if she has adequate upper extremity strength to administer herself or has someone else such as her  or a third party aide assist her. Mrs. Christy is open to receiving both a PEG and Trilogy in the future depending on her quality of life at the time they are needed. She states if a Trilogy provides her with increased comfort, she would like one. She will think about the things we discussed and Kerrie will ask if a PEG would be allowed.    Mrs. Christy also noted she was getting weaker. I informed her this is part of the normal progression of ALS. Informed her that recent labs were normal, still waiting on TB test then I will finish completing paperwork for the home and send it back to them.

## 2017-03-20 ENCOUNTER — TELEPHONE (OUTPATIENT)
Dept: NEUROLOGY | Facility: CLINIC | Age: 82
End: 2017-03-20

## 2017-03-20 NOTE — TELEPHONE ENCOUNTER
Called Kd Jaelyn regarding decision to go on Hospice. Would like to further explain implications of going on Hospice to patient, including inability to remain in PT/OT and to obtain PEG or Trilogy once under Hospice care. Pt was busy and had a lot going on today, she'd like me to call her back tomorrow to go over things.

## 2017-03-21 ENCOUNTER — PATIENT MESSAGE (OUTPATIENT)
Dept: NEUROLOGY | Facility: CLINIC | Age: 82
End: 2017-03-21

## 2017-03-21 LAB
MITOGEN NIL: 6.75 IU/ML
NIL: 0.1 IU/ML
TB ANTIGEN NIL: 0.01 IU/ML
TB ANTIGEN: 0.11 IU/ML
TB GOLD: NEGATIVE

## 2017-03-23 ENCOUNTER — HOSPITAL ENCOUNTER (OUTPATIENT)
Dept: RADIOLOGY | Facility: HOSPITAL | Age: 82
Discharge: HOME | End: 2017-03-23
Attending: PSYCHIATRY & NEUROLOGY
Payer: MEDICARE

## 2017-03-23 ENCOUNTER — OFFICE VISIT (OUTPATIENT)
Dept: NEUROLOGY | Facility: CLINIC | Age: 82
End: 2017-03-23
Payer: MEDICARE

## 2017-03-23 VITALS — SYSTOLIC BLOOD PRESSURE: 125 MMHG | DIASTOLIC BLOOD PRESSURE: 71 MMHG | HEART RATE: 77 BPM | HEIGHT: 65 IN

## 2017-03-23 DIAGNOSIS — R10.9 ABDOMINAL PAIN, UNSPECIFIED LOCATION: ICD-10-CM

## 2017-03-23 DIAGNOSIS — E55.9 VITAMIN D DEFICIENCY: ICD-10-CM

## 2017-03-23 DIAGNOSIS — G12.21 ALS (AMYOTROPHIC LATERAL SCLEROSIS): Primary | ICD-10-CM

## 2017-03-23 DIAGNOSIS — R14.0 BLOATING: ICD-10-CM

## 2017-03-23 DIAGNOSIS — M79.605 BILATERAL LEG PAIN: ICD-10-CM

## 2017-03-23 DIAGNOSIS — M79.604 BILATERAL LEG PAIN: ICD-10-CM

## 2017-03-23 PROCEDURE — 99214 OFFICE O/P EST MOD 30 MIN: CPT | Mod: S$PBB,,, | Performed by: PHYSICIAN ASSISTANT

## 2017-03-23 PROCEDURE — 74000 XR KUB: CPT | Mod: TC

## 2017-03-23 PROCEDURE — 74000 XR KUB: CPT | Mod: 26,,, | Performed by: RADIOLOGY

## 2017-03-23 PROCEDURE — 99999 PR PBB SHADOW E&M-EST. PATIENT-LVL III: CPT | Mod: PBBFAC,,, | Performed by: PHYSICIAN ASSISTANT

## 2017-03-23 NOTE — LETTER
March 24, 2017      Harish Nogueira MD  [address]  [address]  [facility] [address]           [facility]  [address]  [place] [address]  Phone: [phone]  Fax: [phone]          Patient: Jaelyn Edmonds   MR Number: [id number]   YOB: 1935   Date of Visit: 3/23/2017       Dear Dr. Harish Nogueira:    Thank you for referring Jaelyn Edmonds to me for evaluation. Attached you will find relevant portions of my assessment and plan of care.    If you have questions, please do not hesitate to call me. I look forward to following Jaelyn Edmonds along with you.    Sincerely,    Britt Degroot PA-C    Enclosure  CC:  No Recipients    If you would like to receive this communication electronically, please contact [email or web address] or [phone] to request more information on "[employer] Link access.    For providers and/or their staff who would like to refer a patient to [facility], please contact us through our one-stop-shop provider referral line, [facility] [place], at [phone].    If you feel you have received this communication in error or would no longer like to receive these types of communications, please e-mail [email or web address]

## 2017-03-24 NOTE — PROGRESS NOTES
"Subjective  Jaelyn Edmonds is a 80 yo female with ALS, symptom onset RLE 6/2015, dx 7/2016. She comes to clinic today to discuss a few issues, the main one being bloating. Pt notes constant bloating which is uncomfortable, also describes a feeling of abdominal pulling. This has been an ongoing issue. She has tried fiber supplements with no relief. Pt used an enema a few days ago with good symptoms relief. States after she had a BM, her abdomen was less distended and bloating discomfort was relieved. Notes decreased appetite, still eating three meals/day. She also notes she is becoming weaker and feels fatigued, asks if she can increase B12 injections to three times weekly. Has difficulty getting out of bed into wheelchair- would like to try using a trapeze for assistance. Her tongue feels "heavier." denies difficulty chewing/swallowing. Breathing is stable.     Has upcoming plans to move into an assisted living facility with her . Pt is unsure if she will want a PEG and/or Trilogy but would like to consider both when the time comes. She does not want a tracheostomy. She previously mentioned the desire to go on Hospice but after discussing she'd like to wait.     Objective  /71  Pulse 77  Ht 5' 5" (1.651 m)    Assessment/Plan:  1. ALS (amyotrophic lateral sclerosis)  cyanocobalamin, vitamin B-12, 1,000 mcg/mL Kit   2. Bloating  X-Ray KUB   3. Abdominal pain, unspecified location  X-Ray KUB   4. Bilateral leg pain  cyanocobalamin, vitamin B-12, 1,000 mcg/mL Kit   5. Vitamin D deficiency   cyanocobalamin, vitamin B-12, 1,000 mcg/mL Kit     Orders Placed This Encounter   Procedures    X-Ray KUB     -will obtain KUB today-- 3/24/17- family informed of results, no obstruction   -recommend regular use of stool softener and as needed use of enema not to exceed twice weekly   -pt wishes to increase B12 injections to three times weekly   -completed form for assisted living facility   -pt wishes for trapeze- " family will look into options and let us know if they wish me to place order   -RTC for ALS clinic       Britt Degroot PA-C  Department of Neurology   [facility] [place], [place]

## 2017-03-27 ENCOUNTER — TELEPHONE (OUTPATIENT)
Dept: NEUROLOGY | Facility: CLINIC | Age: 82
End: 2017-03-27

## 2017-03-27 DIAGNOSIS — G12.21 ALS (AMYOTROPHIC LATERAL SCLEROSIS): Primary | ICD-10-CM

## 2017-03-27 DIAGNOSIS — Z74.09 IMPAIRED MOBILITY AND ACTIVITIES OF DAILY LIVING: ICD-10-CM

## 2017-03-27 DIAGNOSIS — Z78.9 IMPAIRED MOBILITY AND ACTIVITIES OF DAILY LIVING: ICD-10-CM

## 2017-03-29 ENCOUNTER — TELEPHONE (OUTPATIENT)
Dept: NEUROLOGY | Facility: CLINIC | Age: 82
End: 2017-03-29

## 2017-03-29 NOTE — TELEPHONE ENCOUNTER
----- Message from Jessy Smith sent at 3/29/2017 10:57 AM CDT -----  Contact: Kacy with [facility] [phone]  Kacy is calling to speak with nurse to request a nursing order.

## 2017-03-29 NOTE — TELEPHONE ENCOUNTER
Kacy informed RN that patient has redness noted to buttocks per PT. Requesting order for nurse to complete home visit. Will fax verbal order for signature.

## 2017-04-12 DIAGNOSIS — M79.605 BILATERAL LEG PAIN: ICD-10-CM

## 2017-04-12 DIAGNOSIS — G89.29 CHRONIC MIDLINE LOW BACK PAIN WITHOUT SCIATICA: ICD-10-CM

## 2017-04-12 DIAGNOSIS — M79.604 BILATERAL LEG PAIN: ICD-10-CM

## 2017-04-12 DIAGNOSIS — M54.50 CHRONIC MIDLINE LOW BACK PAIN WITHOUT SCIATICA: ICD-10-CM

## 2017-04-13 RX ORDER — TRAMADOL HYDROCHLORIDE 50 MG/1
TABLET ORAL
Qty: 90 TABLET | Refills: 1 | Status: SHIPPED | OUTPATIENT
Start: 2017-04-13 | End: 2017-05-02 | Stop reason: SDUPTHER

## 2017-04-25 ENCOUNTER — TELEPHONE (OUTPATIENT)
Dept: NEUROLOGY | Facility: CLINIC | Age: 82
End: 2017-04-25

## 2017-04-25 DIAGNOSIS — G12.21 ALS (AMYOTROPHIC LATERAL SCLEROSIS): Primary | ICD-10-CM

## 2017-04-25 DIAGNOSIS — R13.10 DYSPHAGIA, UNSPECIFIED TYPE: ICD-10-CM

## 2017-04-25 DIAGNOSIS — B37.0 ORAL CANDIDIASIS: Primary | ICD-10-CM

## 2017-04-25 RX ORDER — NYSTATIN 100000 [USP'U]/ML
4 SUSPENSION ORAL 4 TIMES DAILY
Qty: 1 BOTTLE | Refills: 0 | Status: SHIPPED | OUTPATIENT
Start: 2017-04-25 | End: 2017-05-05

## 2017-04-25 NOTE — TELEPHONE ENCOUNTER
RN spoke with CORIN Rosen (DEDRICK) at [facility] ([phone]). Patient is experiencing difficulty swallowing and poor appetite. Mrs. Edmonds drinks Ensure throughout the day, which may decrease her appetite; therefore, have changed the frequency of the Ensure. Patient is embarrassed due to experiencing slight choking during meal time. DON has encouraged patient to go down stairs and eat with others. Patient's sister, Nereida Coon, states that Mrs. Edmonds is more active at the [facility] versus just home laying in bed. Patient uses [facility] and visits with others at facility.     Patient c/o thrush. GO De La Torre prescribed Nystatin Suspension. RN faxed prescription to ([phone]). Dr. Kevin Yadav is to began making round in May 2017. Facility has LPNs on staff 24 hours a day. Patient are not allowed to have PEG tubes... Sister, Nereida was under the impression that PEG tubes were allowed.

## 2017-04-26 ENCOUNTER — TELEPHONE (OUTPATIENT)
Dept: SPEECH THERAPY | Facility: HOSPITAL | Age: 82
End: 2017-04-26

## 2017-04-27 ENCOUNTER — DOCUMENTATION ONLY (OUTPATIENT)
Dept: NEUROLOGY | Facility: CLINIC | Age: 82
End: 2017-04-27

## 2017-04-27 NOTE — PROGRESS NOTES
She is having anorexia and dry mouth. Was found to have thrush and is being treated.   Doing 'ok' with constipation, using stool softeners.     She does not want to have a trach, but is interested in the PEG. Not certain she can have that at the facility she is in. She will check with the administration there.     Cesario Mi MD, AMINA, FAAN  Department of Neurology   [facility] [place] [facility], [place]

## 2017-04-27 NOTE — TELEPHONE ENCOUNTER
Called and left a VM.     Cesario Mi MD, AMINA, FAAN  Department of Neurology   [facility] [place] [place]

## 2017-05-02 DIAGNOSIS — G12.21 ALS (AMYOTROPHIC LATERAL SCLEROSIS): ICD-10-CM

## 2017-05-02 DIAGNOSIS — R13.10 DYSPHAGIA, UNSPECIFIED TYPE: Primary | ICD-10-CM

## 2017-05-02 DIAGNOSIS — F41.9 ANXIETY: ICD-10-CM

## 2017-05-02 DIAGNOSIS — M54.50 CHRONIC MIDLINE LOW BACK PAIN WITHOUT SCIATICA: ICD-10-CM

## 2017-05-02 DIAGNOSIS — G89.29 CHRONIC MIDLINE LOW BACK PAIN WITHOUT SCIATICA: ICD-10-CM

## 2017-05-02 DIAGNOSIS — M79.604 BILATERAL LEG PAIN: ICD-10-CM

## 2017-05-02 DIAGNOSIS — M79.605 BILATERAL LEG PAIN: ICD-10-CM

## 2017-05-02 NOTE — TELEPHONE ENCOUNTER
Patient dressing with assistance, combing her hair, and going to breakfast, lunch and dinner downstairs in [facility]. Patient ambulating with assistance device during PT sessions.     Nereida stated she is in the process of obtaining a reclining shower chair for patient. Patient texted sister, while on phone with nurse, stated she went down for breakfast and took a ride around the grounds. Patient seems to be in good spirit and more outgoing.     Refill requested on medication and change in frequency due to patient being on PRN schedule. Per Nereida, it has taken an hour or longer for patient to receive medication, once requested.

## 2017-05-04 ENCOUNTER — TELEPHONE (OUTPATIENT)
Dept: NEUROLOGY | Facility: CLINIC | Age: 82
End: 2017-05-04

## 2017-05-04 ENCOUNTER — CLINICAL SUPPORT (OUTPATIENT)
Dept: SPEECH THERAPY | Facility: HOSPITAL | Age: 82
End: 2017-05-04
Attending: PSYCHIATRY & NEUROLOGY
Payer: MEDICARE

## 2017-05-04 ENCOUNTER — HOSPITAL ENCOUNTER (OUTPATIENT)
Dept: RADIOLOGY | Facility: HOSPITAL | Age: 82
Discharge: HOME | End: 2017-05-04
Attending: PSYCHIATRY & NEUROLOGY
Payer: MEDICARE

## 2017-05-04 DIAGNOSIS — G12.21 ALS (AMYOTROPHIC LATERAL SCLEROSIS): ICD-10-CM

## 2017-05-04 DIAGNOSIS — R29.898 WEAKNESS OF BOTH LEGS: ICD-10-CM

## 2017-05-04 DIAGNOSIS — G12.21 ALS (AMYOTROPHIC LATERAL SCLEROSIS): Primary | ICD-10-CM

## 2017-05-04 DIAGNOSIS — R13.10 DYSPHAGIA, UNSPECIFIED TYPE: ICD-10-CM

## 2017-05-04 DIAGNOSIS — R13.10 DYSPHAGIA: ICD-10-CM

## 2017-05-04 PROCEDURE — G8998 SWALLOW D/C STATUS: HCPCS | Mod: CJ

## 2017-05-04 PROCEDURE — G8996 SWALLOW CURRENT STATUS: HCPCS | Mod: CJ

## 2017-05-04 PROCEDURE — 92611 MOTION FLUOROSCOPY/SWALLOW: CPT

## 2017-05-04 PROCEDURE — 74230 X-RAY XM SWLNG FUNCJ C+: CPT | Mod: 26,,, | Performed by: RADIOLOGY

## 2017-05-04 PROCEDURE — G8997 SWALLOW GOAL STATUS: HCPCS | Mod: CJ

## 2017-05-04 PROCEDURE — 74230 X-RAY XM SWLNG FUNCJ C+: CPT | Mod: TC

## 2017-05-04 RX ORDER — TRAMADOL HYDROCHLORIDE 50 MG/1
50 TABLET ORAL 3 TIMES DAILY
Qty: 90 TABLET | Refills: 1 | Status: SHIPPED | OUTPATIENT
Start: 2017-05-04 | End: 2017-08-07 | Stop reason: SDUPTHER

## 2017-05-04 RX ORDER — ALPRAZOLAM 0.25 MG/1
0.25 TABLET ORAL 3 TIMES DAILY
Qty: 90 TABLET | Refills: 3 | Status: SHIPPED | OUTPATIENT
Start: 2017-05-04 | End: 2017-08-31 | Stop reason: SDUPTHER

## 2017-05-04 NOTE — PROGRESS NOTES
"TIME RECORD    Date: 05/04/2017      Start Time:  1030  Stop Time:  1104    PROCEDURES:      UNTIMED  Procedure Min.   Modified Barium Swallow Study 34         Total Timed Minutes:  34  Total Timed Units:  0  Total Untimed Units:  1  Charges Billed/# of units:  1    REHAB SERVICE VIDEO SWALLOW STUDY    MRN:  [id number]  Referring Provider: Patrick Mi MD  [address] [place] [address]  Onset Date:  ALS dx over 1 year ago  SOC Date:  05/04/2017  Primary Diagnosis:  ALS  Treatment Diagnosis:  Oropharyngeal Dysphagia  Secondary Diagnosis:  Dysarthria  Pertinent Medical History:    Past Medical History:   Diagnosis Date    ALS (amyotrophic lateral sclerosis)     Breast cancer 2011    Breast cancer - lumpectomy, radiation, no chemo; L    Hypertension     Hypothyroid      Past Surgical History:   Procedure Laterality Date    SPINE SURGERY  2015    L4-5 laminectomy     Noted in chart review:   "Patient is experiencing difficulty swallowing and poor appetite. Mrs. Edmonds drinks Ensure throughout the day, which may decrease her appetite; therefore, have changed the frequency of the Ensure. Patient is embarrassed due to experiencing slight choking during meal time. DON has encouraged patient to go down stairs and eat with others. Patient's sister, Nereida Coon, states that Mrs. Edmonds is more active at the [facility] versus just home laying in bed. Patient uses [facility] and visits with others at facility.   Patient c/o thrush."    Prior Therapy Dates/Results (same condition):  Previous OP ST evaluations noted on 10/16, 01/17, and 03/17. Previous MBSS completed on 8/10/2016. Pt reported she began HH ST about a week or 2 ago. Pt reported her tongue feeling very weak/tired from previous ST session yesterday.   Prior Level of Function:  Independent with feeding.   Previous MBSS results and recommendations were as follows: 08/10/2016  IMPRESSIONS:   This 80 y.o. woman appears to present with  1. " Pharyngeal dysphagia for thin liquids characterized by SILENT aspiration of small boluses when no strategy was utilized. The pattern of travel of the aspirated material suggested possible R-sided weakness (specific etiology unclear), and use of a R head turn with liquids was protective during the study. Solids were swallowed with no evidence of dysphagia.  2. Mild dysphonia with accompanying complaint of upper respiratory issues and burning throat and tongue. Voice quality was not consistent with classic presentation of TVF paralysis.  3. Recent ALS diagnosis.  RECOMMENDATIONS/PLAN OF CARE:   It is felt that Mrs. Edmonds would benefit from  1. Continuation of her current regular diet as tolerated with thin liquids with the following strategies and common aspiration precautions, including, but not limited to  A. Appropriate seating for all eating and drinking.  B. Use of a Right head turn and single swallows of all liquids and when taking medications with liquids.  C. Additional dry swallows after liquid and solid swallows to help clear any residue.  D. Monitoring for any signs/symptoms of aspiration (such as wet/gurgly voice that does not clear with coughing, inability to make any voice sounds, any persistent coughing with oral intake, otherwise unexplained fever, unexplained increased or new difficulty or discomfort breathing, unexplained increase in sleepiness/lethargy/significant fatigue, unexplained increase or new onset confusion or change in cognitive functioning, or any other unexplained change in health or well-being that could be related to swallowing).  2. Consultation with Mrs. Annmarie Zaragoza, SLP, for voice banking and baseline assessment.  3. Consideration of consultation with Dr. Jae Case, laryngologist, to further assess status and functioning of pharynx and larynx in light of suspected R-sided weakness.  4. Continued follow-up with Dr. Mi as directed.  5. Repeat MBSS if/when there are  "concerns re: changes in swallowing function.  Functional Deficits Leading to Referral:  ALS dx; pt reported increasing difficulty swallowing with choking episodes; xerostomia; thrush  Social Cultural:  Pt living at [facility], pt reported her son unexpectantly  2 days ago and she is not doing well.   Nutrition:  Ensure; regular diet; thin liquids; no difficulty with swallowing medications. SLP asked pt if she has been following recommendation to complete head turn to the right with all thin liquid swallows, and she reported, "No. I haven't been ahving trouble, so I stopped doing it. I don't cough or anything."  Signs of Abuse:  No  Medication:    Current Outpatient Prescriptions on File Prior to Visit   Medication Sig Dispense Refill    alprazolam (XANAX) 0.25 MG tablet Take 1 tablet (0.25 mg total) by mouth 3 (three) times daily. 90 tablet 3    ARMOUR THYROID 30 mg Tab Take 30 mg by mouth once daily.  0    cetirizine (ZYRTEC) 10 MG tablet Take 1 tablet (10 mg total) by mouth once daily. 90 tablet 11    cyanocobalamin, vitamin B-12, 1,000 mcg/mL Kit Inject 1,000 mcg as directed 3 (three) times a week. 12 kit 5    lisinopril (PRINIVIL,ZESTRIL) 40 MG tablet Take 1 tablet (40 mg total) by mouth once daily. 90 tablet 11    magnesium 30 mg Tab Take by mouth.      montelukast (SINGULAIR) 10 mg tablet Take 1 tablet (10 mg total) by mouth after dinner. 90 tablet 11    multivitamin capsule Take 4 capsules by mouth once daily.      nystatin (MYCOSTATIN) 100,000 unit/mL suspension Take 4 mLs (400,000 Units total) by mouth 4 (four) times daily. swish and retain for as long as possible (several minutes) before swallowing 1 Bottle 0    tramadol (ULTRAM) 50 mg tablet Take 1 tablet (50 mg total) by mouth 3 (three) times daily. 90 tablet 1    triamterene-hydrochlorothiazide 37.5-25 mg (MAXZIDE-25) 37.5-25 mg per tablet Take 1 tablet by mouth once daily. 90 tablet 11    TURMERIC (CURCUMIN MISC) by Misc.(Non-Drug; " "Combo Route) route once daily.       Current Facility-Administered Medications on File Prior to Visit   Medication Dose Route Frequency Provider Last Rate Last Dose    [COMPLETED] varibar nectar Ba Sulfate 10 mL  10 mL Oral ONCE PRN Patrick Mi MD   10 mL at 05/04/17 1106    [COMPLETED] varibar pudding Ba Sulfate 10 mL  10 mL Oral ONCE PRN Patrick Mi MD   10 mL at 05/04/17 1106    [COMPLETED] varibar thin liquid ba sulfate 30 mL  30 mL Oral ONCE PRN Patrick Mi MD   30 mL at 05/04/17 1106     Alertness/Attention:  WFL  Oral Motor:    Oral Musculature: WFL  Structure Abnormalities: Thrush noted on tongue; pt currently being treated for thrush; noted enlarged soft palate; pt reported she has had a "large soft palate" her whole life.   Dentition: present and adequate  Secretion Management: no difficulties managing secretions  Mucosal Quality: xerostomia; pt reported medications causing xerostomia  Mandibular Strength and Mobility: WFL  Oral Labial Strength and Mobility: WFL  Lingual Strength and Mobility: mild weakness; noted thrush  Velar Elevation: WFL  Buccal Strength and Mobility: WFL  Volitional Cough: elicited  Volitional Swallow: timely  Voice Prior to PO Intake: clear  Oral Musculature Comments: Pt reported her dry mouth and lingual fatigue from exercises "may affect how she does." Pt also reported PND.     Patient Position:  Sitting  View:  Lateral  Presentations:    THIN:- 5cc, 10cc via cup no head turn 10 cc, self regulated consecutive cup sips and straw sips of thin with head turn to right  NECTAR:- self regulated cup sip x1  PUREE:- self fed tsp bites of pudding with barium paste x2  DENTAL SOFT:- self fed tsp bites of peaches x2  SOLID:- 1 inch bite of patience cracker with barium paste x1  BARIUM TABLET:- 1 barium tablet thin liquid wash with head turn to the right x1    Oral Phase:  Oral phase WFL; unremarkable. Pt with adequate bolus manipulation, timely a-p transit, no " premature spillage    Pharyngeal Phase:    Pt with moderate pharyngeal dysphagia c/b penetration of second swallow of 5cc thin liquids and subsequent thin liquid trials with no strategy utilized. As noted in previous MBSS, pt benefits from a head turn to the right which prevents penetration during the swallow of thin liquids.Decreased BOT retraction and pharyngeal wall constriction resulted in trace-mild BOT and vallecular residue with thin liquids and solid trials, moderate pharyngeal wall and vallecular residue with nectar thick liquids, and moderate vallecular and piriform sinus residue with puree trials. Cues second swallows and thin liquid wash with a head turn to the right aid in clearing pharyngeal residue to a trace-mild amount. Pharyngeal residue increases as viscosities increase. No noted aspiration or penetration with thin liquid trials with a head turn to the right, puree, dental soft, regular, or barium tablet trials.     Esophageal Phase: Unremarkable, adequate UES opening, no noted significant esophageal backflow    Strategies/Compensatory Techniques Utilized:  Head turn to the right, small bites/sips, multiple swallows, liquid wash    Impressions:   Pt with moderate pharyngeal dysphagia c/b penetration of second swallow of 5cc thin liquids and subsequent thin liquid trials with no strategy utilized; decreased BOT retraction and pharyngeal wall constriction resulting in trace-mild BOT and vallecular residue with thin liquids and solid trials, moderate pharyngeal wall and vallecular residue with nectar thick liquids, and moderate vallecular and piriform sinus residue with puree trials. Pharyngeal residue increases as viscosities increase. No noted aspiration or penetration with thin liquid trials with a head turn to the right, puree, dental soft, regular, or barium tablet trials    Education: Pt and pt's family educated on MBSS results, SLP recommendations, importance of following safe swallowing  precautions, s/s and risks of aspiration, degenerative nature of ALS and swallowing functioning, importance of continuing ST. They verbalized understanding of all discussed and all questions/concerns addressed.   Prognosis: Prognosis for improvement is guarded 2/2 degenerative nature of ALS dx. Pt with excellent family support. Pt motivated.     Recommendations/Precautions:   1. Regular diet  2. Thin liquids WITH A HEAD TURN TO THE RIGHT  3. Double swallows with thicker viscosities  4. Alternate sips/bites  5. Upright for all po intake and remain upright for 20 minutes s/p po intake  6. Small bites/sips  7. Slow rate  8. Monitor for s/s of aspiration    Plan:  Continue dysphagia therapy with  SLP to improve overall swallowing functioning, provide education, and assess tolerance of recommended diet. F/u with MD. Repeat MBSS if noted increased swallowing difficulties or increased consistent s/s of aspiration.     JONNIE Sanchez., CCC-SLP  [phone]  05/04/2017      I CERTIFY THE NEED FOR THESE SERVICES FURNISHED UNDER THIS PLAN OF TREATMENT AND WHILE UNDER MY CARE    Physician's comments: ________________________________________________________________________________________________________________________________________________      Physician's Name: ___________________________________

## 2017-05-04 NOTE — TELEPHONE ENCOUNTER
----- Message from Felton Elder sent at 5/4/2017 10:22 AM CDT -----  [facility]   Would like changed made to medication alprazolam (XANAX) 0.25 MG tablet     Call: [phone]

## 2017-05-19 ENCOUNTER — TELEPHONE (OUTPATIENT)
Dept: NEUROLOGY | Facility: CLINIC | Age: 82
End: 2017-05-19

## 2017-05-25 ENCOUNTER — HOSPITAL ENCOUNTER (OUTPATIENT)
Dept: RADIOLOGY | Facility: HOSPITAL | Age: 82
Discharge: HOME | End: 2017-05-25
Attending: PSYCHIATRY & NEUROLOGY
Payer: MEDICARE

## 2017-05-25 DIAGNOSIS — R60.9 EDEMA, UNSPECIFIED TYPE: ICD-10-CM

## 2017-05-25 DIAGNOSIS — Z78.9 IMPAIRED MOBILITY AND ADLS: ICD-10-CM

## 2017-05-25 DIAGNOSIS — R47.1 DYSARTHRIA: ICD-10-CM

## 2017-05-25 DIAGNOSIS — Z74.09 IMPAIRED MOBILITY AND ADLS: ICD-10-CM

## 2017-05-25 DIAGNOSIS — R49.0 DYSPHONIA: ICD-10-CM

## 2017-05-25 DIAGNOSIS — R49.9 VOICE DISTURBANCE: ICD-10-CM

## 2017-05-25 DIAGNOSIS — G89.29 CHRONIC MIDLINE LOW BACK PAIN WITHOUT SCIATICA: ICD-10-CM

## 2017-05-25 DIAGNOSIS — G12.21 ALS (AMYOTROPHIC LATERAL SCLEROSIS): ICD-10-CM

## 2017-05-25 DIAGNOSIS — M41.9 SCOLIOSIS OF THORACIC SPINE, UNSPECIFIED SCOLIOSIS TYPE: ICD-10-CM

## 2017-05-25 DIAGNOSIS — M54.50 CHRONIC MIDLINE LOW BACK PAIN WITHOUT SCIATICA: ICD-10-CM

## 2017-05-25 PROCEDURE — 93970 EXTREMITY STUDY: CPT | Mod: 26,,, | Performed by: RADIOLOGY

## 2017-05-25 PROCEDURE — 93970 EXTREMITY STUDY: CPT | Mod: TC

## 2017-05-26 ENCOUNTER — LAB VISIT (OUTPATIENT)
Dept: LAB | Facility: HOSPITAL | Age: 82
End: 2017-05-26
Attending: INTERNAL MEDICINE
Payer: MEDICARE

## 2017-05-26 ENCOUNTER — OFFICE VISIT (OUTPATIENT)
Dept: INTERNAL MEDICINE | Facility: CLINIC | Age: 82
End: 2017-05-26
Payer: MEDICARE

## 2017-05-26 ENCOUNTER — TELEPHONE (OUTPATIENT)
Dept: INTERNAL MEDICINE | Facility: CLINIC | Age: 82
End: 2017-05-26

## 2017-05-26 ENCOUNTER — TELEPHONE (OUTPATIENT)
Dept: NEUROLOGY | Facility: CLINIC | Age: 82
End: 2017-05-26

## 2017-05-26 VITALS
WEIGHT: 118 LBS | HEART RATE: 88 BPM | BODY MASS INDEX: 19.66 KG/M2 | HEIGHT: 65 IN | OXYGEN SATURATION: 96 % | SYSTOLIC BLOOD PRESSURE: 110 MMHG | DIASTOLIC BLOOD PRESSURE: 64 MMHG

## 2017-05-26 DIAGNOSIS — R53.83 FATIGUE, UNSPECIFIED TYPE: ICD-10-CM

## 2017-05-26 DIAGNOSIS — J02.9 PHARYNGITIS, UNSPECIFIED ETIOLOGY: Primary | ICD-10-CM

## 2017-05-26 DIAGNOSIS — R09.82 POST-NASAL DRIP: ICD-10-CM

## 2017-05-26 DIAGNOSIS — R35.0 URINE FREQUENCY: ICD-10-CM

## 2017-05-26 LAB
ANION GAP SERPL CALC-SCNC: 9 MMOL/L
BASOPHILS # BLD AUTO: 0.06 K/UL
BASOPHILS NFR BLD: 1 %
BILIRUB UR QL STRIP: NEGATIVE
BUN SERPL-MCNC: 10 MG/DL
CALCIUM SERPL-MCNC: 10 MG/DL
CHLORIDE SERPL-SCNC: 97 MMOL/L
CLARITY UR REFRACT.AUTO: ABNORMAL
CO2 SERPL-SCNC: 28 MMOL/L
COLOR UR AUTO: YELLOW
CREAT SERPL-MCNC: 0.6 MG/DL
DIFFERENTIAL METHOD: NORMAL
EOSINOPHIL # BLD AUTO: 0.1 K/UL
EOSINOPHIL NFR BLD: 1 %
ERYTHROCYTE [DISTWIDTH] IN BLOOD BY AUTOMATED COUNT: 12.9 %
EST. GFR  (AFRICAN AMERICAN): >60 ML/MIN/1.73 M^2
EST. GFR  (NON AFRICAN AMERICAN): >60 ML/MIN/1.73 M^2
GLUCOSE SERPL-MCNC: 85 MG/DL
GLUCOSE UR QL STRIP: NEGATIVE
HCT VFR BLD AUTO: 45.5 %
HGB BLD-MCNC: 15.5 G/DL
HGB UR QL STRIP: NEGATIVE
KETONES UR QL STRIP: NEGATIVE
LEUKOCYTE ESTERASE UR QL STRIP: NEGATIVE
LYMPHOCYTES # BLD AUTO: 1.4 K/UL
LYMPHOCYTES NFR BLD: 23.1 %
MCH RBC QN AUTO: 30.2 PG
MCHC RBC AUTO-ENTMCNC: 34.1 %
MCV RBC AUTO: 89 FL
MONOCYTES # BLD AUTO: 0.5 K/UL
MONOCYTES NFR BLD: 8.5 %
NEUTROPHILS # BLD AUTO: 4.1 K/UL
NEUTROPHILS NFR BLD: 66.2 %
NITRITE UR QL STRIP: NEGATIVE
PH UR STRIP: 7 [PH] (ref 5–8)
PLATELET # BLD AUTO: 292 K/UL
PMV BLD AUTO: 11 FL
POTASSIUM SERPL-SCNC: 4 MMOL/L
PROT UR QL STRIP: NEGATIVE
RBC # BLD AUTO: 5.14 M/UL
SODIUM SERPL-SCNC: 134 MMOL/L
SP GR UR STRIP: 1 (ref 1–1.03)
T4 FREE SERPL-MCNC: 1.07 NG/DL
TSH SERPL DL<=0.005 MIU/L-ACNC: 5.26 UIU/ML
URN SPEC COLLECT METH UR: ABNORMAL
UROBILINOGEN UR STRIP-ACNC: NEGATIVE EU/DL
WBC # BLD AUTO: 6.24 K/UL

## 2017-05-26 PROCEDURE — 80048 BASIC METABOLIC PNL TOTAL CA: CPT

## 2017-05-26 PROCEDURE — 85025 COMPLETE CBC W/AUTO DIFF WBC: CPT

## 2017-05-26 PROCEDURE — 99999 PR PBB SHADOW E&M-EST. PATIENT-LVL IV: CPT | Mod: PBBFAC,,, | Performed by: INTERNAL MEDICINE

## 2017-05-26 PROCEDURE — 84439 ASSAY OF FREE THYROXINE: CPT

## 2017-05-26 PROCEDURE — 84443 ASSAY THYROID STIM HORMONE: CPT

## 2017-05-26 PROCEDURE — 99214 OFFICE O/P EST MOD 30 MIN: CPT | Mod: S$PBB,,, | Performed by: INTERNAL MEDICINE

## 2017-05-26 PROCEDURE — 36415 COLL VENOUS BLD VENIPUNCTURE: CPT | Mod: PO

## 2017-05-26 RX ORDER — IBUPROFEN 800 MG/1
800 TABLET ORAL EVERY 6 HOURS PRN
Qty: 40 TABLET | Refills: 0 | Status: SHIPPED | OUTPATIENT
Start: 2017-05-26 | End: 2017-06-05

## 2017-05-26 RX ORDER — FLUTICASONE PROPIONATE 50 MCG
2 SPRAY, SUSPENSION (ML) NASAL DAILY
Qty: 16 G | Refills: 1 | Status: SHIPPED | OUTPATIENT
Start: 2017-05-26 | End: 2017-06-25

## 2017-05-26 RX ORDER — FLUTICASONE PROPIONATE 50 MCG
2 SPRAY, SUSPENSION (ML) NASAL DAILY
Qty: 16 G | Refills: 12 | Status: SHIPPED | OUTPATIENT
Start: 2017-05-26 | End: 2017-05-26 | Stop reason: SDUPTHER

## 2017-05-26 NOTE — TELEPHONE ENCOUNTER
----- Message from Darlene Dorsey sent at 5/26/2017 12:07 PM CDT -----  Contact: [facility]  can be reached at [phone]  The [facility]  is requesting doctor notes on pt  to be faxed to [facility] at [phone].      Thank you!

## 2017-05-26 NOTE — PROGRESS NOTES
Portions of this note are generated with voice recognition software. Typographical errors may exist.     SUBJECTIVE:    This is a/an 81 y.o. female here for primary care visit for  Chief Complaint   Patient presents with    Sore Throat     x3 weeks     Patient states that she has been having pharyngitis symptoms for the last 3 days.  States that this is been happening in the setting of significant postnasal drip symptoms.  States that they have worsened since she ran out of Flonase approximately 5 days ago.  She denies any constitutional symptoms.  She has chronic issues with swallowing which are actively being evaluated by speech therapy.  She also states that she has recent clinical diagnosis of thrush for which she is still actively receiving therapy with nystatin.  Patient states that she took most recent dose of nystatin today.  Patient said that she had a thick coated feeling of her tongue when she started on nystatin.  She also says that her swallowing symptoms contributed to the overall decision to start nystatin.  Patient provides vague impressions of how oropharyngeal symptoms have changed since starting nystatin.  Risk factors for thrush include chronic use of nasal corticosteroid.  She states that she typically uses Flonase on a daily basis and does not rinse out her mouth.     Gen. fatigue. Patient has multiple somatic complaints today including overall fatigue.  States that she is taking her thyroid medication.  States that she does have problems with anxiety and depression.  She does not have active follow-up with PCP at this time.    Urinary frequency.  States that for at least the last 7 days she has been having increased frequency of voiding.  Denies dysuria.    Medications Reviewed and Updated    Past medical, family, and social histories were reviewed and updated.    Review of Systems negative unless noted otherwise in history of present illness-  General ROS: negative  Psychological ROS:  negative  ENT ROS: negative  Allergy and Immunology ROS: negative  Hematological and Lymphatic ROS: negative  Respiratory ROS: negative  Genito-Urinary ROS: negative    Allergic:  Review of patient's allergies indicates:  No Known Allergies    OBJECTIVE:  BP: 110/64 Pulse: 88    Wt Readings from Last 3 Encounters:   05/26/17 53.5 kg (118 lb)   03/15/17 113.1 kg (249 lb 5.4 oz)   12/19/16 59 kg (130 lb)    Body mass index is 19.64 kg/m².  Previous Blood Pressure Readings :   BP Readings from Last 3 Encounters:   05/26/17 110/64   03/23/17 125/71   03/15/17 112/66       GEN: No apparent distress  HEENT: sclera non-icteric, conjunctiva clear.  Oral pharyngeal cobblestoning.  No exudates.  Edematous turbinates bilateral nasal passages.  No obvious polyposis.  CV: no peripheral edema  PULM: breathing non-labored  ABD: non, protuberant abdomen.  PSYCH: appropriate affect  MSK: unable to rise from chair without assistance  SKIN: normal skin turgor    Pertinent Labs Reviewed       ASSESSMENT/PLAN:    Pharyngitis, unspecified etiology.  Etiology likely due to postnasal drip syndrome.  Counseling on self-care measures  -     ibuprofen (ADVIL,MOTRIN) 800 MG tablet; Take 1 tablet (800 mg total) by mouth every 6 (six) hours as needed for Pain.  Dispense: 40 tablet; Refill: 0  -     Strep A culture, throat    Urine frequency.  Rule out urinary infection  -     URINALYSIS  -     Urine culture; Future; Expected date: 05/26/2017    Fatigue, unspecified type.  Etiology likely multifactorial.  Basic blood work and follow-up with PCP  -     Basic metabolic panel; Future; Expected date: 05/26/2017  -     CBC auto differential; Future; Expected date: 05/26/2017  -     TSH; Future; Expected date: 05/26/2017    Post-nasal drip.  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  No clear indications for anti-microbial therapy at this time.  Counseling on self-care measures.  -     fluticasone (FLONASE) 50 mcg/actuation nasal spray; 2 sprays by Each  Nare route once daily.  Dispense: 16 g; Refill: 12          Future Appointments  Date Time Provider Department Center   6/13/2017 9:00 AM Ismael Sanchez MD [facility]   6/14/2017 8:00 AM ALS, CLINIC [facility]  5/26/2017  12:34 PM

## 2017-05-26 NOTE — PATIENT INSTRUCTIONS
"  Recommendations for today    I want you to restart medication Flonase.  Follow the instructions below on how to use Flonase.  Continue using Zyrtec.  Also consider using Mucinex to help with postnasal drip symptoms.    For sore throat you can consider ibuprofen 800 mg.  This is prescription strength.  You can pick this up at the pharmacy. Stop using ibuprofen if it starts to cause stomach upset      How to use your Flonase   - flonase is not a very good "as needed medicine" for nasal congestion  - it works best when you use it for a period of at least 7 days in A ROW.   - I want you to take this for 14 days in a row then take a 1 week break.   - After 1 week break, if congestion returns, you can start for another 14 days.   - Always take a 7 day break between uses so you don't get side effects like nose bleed or sore throat.   - Gargle after each use of Flonase and spit out the water     Medicines you can take all month long for congestion  - for seasonal allergies and congestion, use cetirizine (zyrtec)   - take this throughout the year when your symptoms are coming back   - safe to take all week or month as needed  - take breaks when you think your triggers are going away    Decongestants   - do not use daily, this can make your congestion worse over time   - for rapid relief of allergy congestion you can try something like Claritin-D with a decongestant. Afrin. Phenylerphrine   - this is for rapid relief and should only be used as needed, less than 4 days per week.     NASAL SALINE   Washing the nasal cavities with saline reduces postnasal drainage, removes secretions, and rinses away allergens and irritants.     Saline washes can be used immediately prior to administration of other intranasal medications so that the mucosa is freshly cleansed when the medications are introduced    You can use this daily, or multiple times daily, depending on the severity of symptoms      Reasons to Call Clinic   · Start to " have headache, facial pain, yellow mucous, fevers.

## 2017-05-26 NOTE — TELEPHONE ENCOUNTER
"Patient c/o extreme dry mouth x 3 to 4 weeks. Currently, using Biotin; however,  It is not effective. Was treated for thrush with Nystatin Oral, which has resolved but her tongue still feels heavy. Clinic appt with Dr. Delarosa on this morning, 5/26/17. Strep and Urinalysis remains pending at this time.  Patient voice becoming weaker. Mrs. Edmonds states that she forces herself to talk, due to it's difficult to speak. Denies any swallowing difficult, remains on regular diet w/ regular liquids. States that she has "no appetite"; however, forces herself to eat. Denies any breathing difficulty. Patient does note that she must sit erect in chair to breath freely. She has a cough assist.  Patient non-ambulatory and upper body strength decrease lia right hand.  Mr. Edmonds mentioned that frequent urination on yesterday, 5/25 and this morning; however, issue has resolved.     She states that the family is still numb from her son's unexpected death. Patient is beginning to feel reality set in and it's diffiucult for her and the family. Would like to thank the ALS Team for their Condolences and Sympathy Card.   "

## 2017-05-27 NOTE — TELEPHONE ENCOUNTER
Dealing with dry mouth. Sleeps with mouth open. Also not sure about getting enough water in every day.   Not taking Singular.     Still breathing okay, but noticing a bit more difficulty taking deep breaths. Has noticed changes in her speech.     Not interested in tracheostomy. May be interested in trilogy. Would like to discuss at clinic.     Has cx for strep and urine pending. May need abx. She will call and let us know.    DL

## 2017-05-29 LAB — BACTERIA THROAT CULT: NORMAL

## 2017-06-05 ENCOUNTER — TELEPHONE (OUTPATIENT)
Dept: INTERNAL MEDICINE | Facility: CLINIC | Age: 82
End: 2017-06-05

## 2017-06-05 ENCOUNTER — TELEPHONE (OUTPATIENT)
Dept: NEUROLOGY | Facility: CLINIC | Age: 82
End: 2017-06-05

## 2017-06-05 DIAGNOSIS — K59.00 CONSTIPATION, UNSPECIFIED CONSTIPATION TYPE: Primary | ICD-10-CM

## 2017-06-05 DIAGNOSIS — G12.21 ALS (AMYOTROPHIC LATERAL SCLEROSIS): ICD-10-CM

## 2017-06-05 RX ORDER — GLYCERIN 1 G/1
1 SUPPOSITORY RECTAL
Qty: 10 SUPPOSITORY | Refills: 6 | Status: SHIPPED | OUTPATIENT
Start: 2017-06-05

## 2017-06-05 NOTE — TELEPHONE ENCOUNTER
Pt appt with Dr. Sanchez is June 13 spoke to pt pt states she will like to come on the same day since she is too weak to come two days in a row informed pt that  Is booked but pt is insisting that I send this message over to the

## 2017-06-05 NOTE — TELEPHONE ENCOUNTER
----- Message from Eli Doll RN sent at 6/5/2017  8:40 AM CDT -----  Good Morning,    Patient has ALS Clinic appt scheduled on 6/14/17 in [facility]. Mrs. Edmonds is requesting an appt with Dr. Sanchez on same day. Please follow-up with me regarding appt date and time if possible. Thanks in advance for your assistance with this matter.     Best Regards,  Marie RN  ALS Clinic Coordinator

## 2017-06-05 NOTE — TELEPHONE ENCOUNTER
Patient called to get test results for Step A and Urinalysis that are WNL. However, elevated TSH level... Patient currently takes [place] 30mg tabs daily.     RN will contact Jemal with [facility] regarding trunk support and cushion of chair comfort. Also, Mrs. Edmonds is requesting appt with Dr. Sanchez on same day as ALS Clinic appt, 6/14/17... Will send message and call to arrange appt.

## 2017-06-09 ENCOUNTER — DOCUMENTATION ONLY (OUTPATIENT)
Dept: NEUROLOGY | Facility: CLINIC | Age: 82
End: 2017-06-09

## 2017-06-09 ENCOUNTER — TELEPHONE (OUTPATIENT)
Dept: INTERNAL MEDICINE | Facility: CLINIC | Age: 82
End: 2017-06-09

## 2017-06-09 RX ORDER — ZOLPIDEM TARTRATE 5 MG/1
5 TABLET ORAL NIGHTLY PRN
Qty: 30 TABLET | Refills: 1 | OUTPATIENT
Start: 2017-06-09 | End: 2017-08-07 | Stop reason: SDUPTHER

## 2017-06-09 NOTE — PROGRESS NOTES
Spoke with daughter. Ms Edmonds is having some issues with constipation, hypertension, and trouble sleeping (this is lifelong).     Plan  - will prescribe low-dose ambien for sleep  - will discuss anti-depressant with patient at clinic next week  - will reach out to Dr Sanchez to discuss HTN management  - will discuss GI management with pt and dtr later today    Cesario Mi MD, AMINA, FAAN  Department of Neurology   [facility] [place], [place];

## 2017-06-09 NOTE — Clinical Note
Dom - Can you fax the ambien script to [facility]?  Micaelax  Berenice - Mr Grey is one of our ALS patients. She has been having some elevated BPs out at the assisted living facility. Would you mind reaching out to her to see what you might be able to do with her BP? I'm happy to discuss if you wish. Thanks.

## 2017-06-09 NOTE — TELEPHONE ENCOUNTER
----- Message from Patrick Mi MD sent at 6/9/2017  8:29 AM CDT -----  Dom - Can you fax the ambien script to [facility]?   Jayashree Ng - Mr Grey is one of our ALS patients. She has been having some elevated BPs out at the assisted living facility. Would you mind reaching out to her to see what you might be able to do with her BP? I'm happy to discuss if you wish. Thanks.

## 2017-06-12 ENCOUNTER — HOSPITAL ENCOUNTER (INPATIENT)
Facility: HOSPITAL | Age: 82
LOS: 1 days | Discharge: HOME HEALTH | DRG: 641 | End: 2017-06-13
Attending: EMERGENCY MEDICINE | Admitting: HOSPITALIST
Payer: MEDICARE

## 2017-06-12 DIAGNOSIS — F32.9 REACTIVE DEPRESSION: Primary | ICD-10-CM

## 2017-06-12 DIAGNOSIS — K59.00 CONSTIPATION: ICD-10-CM

## 2017-06-12 DIAGNOSIS — E87.1 HYPONATREMIA: Primary | ICD-10-CM

## 2017-06-12 PROBLEM — K59.09 CHRONIC CONSTIPATION: Status: ACTIVE | Noted: 2017-06-12

## 2017-06-12 PROBLEM — B37.0 THRUSH: Status: ACTIVE | Noted: 2017-06-12

## 2017-06-12 PROBLEM — E03.9 HYPOTHYROIDISM: Status: ACTIVE | Noted: 2017-06-12

## 2017-06-12 PROBLEM — R63.0 POOR APPETITE: Status: ACTIVE | Noted: 2017-06-12

## 2017-06-12 LAB
ALBUMIN SERPL BCP-MCNC: 3.8 G/DL
ALP SERPL-CCNC: 97 U/L
ALT SERPL W/O P-5'-P-CCNC: 29 U/L
ANION GAP SERPL CALC-SCNC: 11 MMOL/L
AST SERPL-CCNC: 36 U/L
BASOPHILS # BLD AUTO: 0.03 K/UL
BASOPHILS NFR BLD: 0.4 %
BILIRUB SERPL-MCNC: 0.7 MG/DL
BILIRUB UR QL STRIP: NEGATIVE
BUN SERPL-MCNC: 12 MG/DL
CALCIUM SERPL-MCNC: 9.7 MG/DL
CHLORIDE SERPL-SCNC: 90 MMOL/L
CLARITY UR REFRACT.AUTO: CLEAR
CO2 SERPL-SCNC: 24 MMOL/L
COLOR UR AUTO: NORMAL
CREAT SERPL-MCNC: 0.7 MG/DL
CREAT UR-MCNC: 18 MG/DL
DIFFERENTIAL METHOD: ABNORMAL
EOSINOPHIL # BLD AUTO: 0.1 K/UL
EOSINOPHIL NFR BLD: 0.8 %
ERYTHROCYTE [DISTWIDTH] IN BLOOD BY AUTOMATED COUNT: 12.8 %
EST. GFR  (AFRICAN AMERICAN): >60 ML/MIN/1.73 M^2
EST. GFR  (NON AFRICAN AMERICAN): >60 ML/MIN/1.73 M^2
GLUCOSE SERPL-MCNC: 99 MG/DL
GLUCOSE UR QL STRIP: NEGATIVE
HCT VFR BLD AUTO: 47.2 %
HGB BLD-MCNC: 16.6 G/DL
HGB UR QL STRIP: NEGATIVE
KETONES UR QL STRIP: NEGATIVE
LEUKOCYTE ESTERASE UR QL STRIP: NEGATIVE
LYMPHOCYTES # BLD AUTO: 2.2 K/UL
LYMPHOCYTES NFR BLD: 28.2 %
MAGNESIUM SERPL-MCNC: 2.2 MG/DL
MCH RBC QN AUTO: 29.7 PG
MCHC RBC AUTO-ENTMCNC: 35.2 %
MCV RBC AUTO: 85 FL
MONOCYTES # BLD AUTO: 0.7 K/UL
MONOCYTES NFR BLD: 9.1 %
NEUTROPHILS # BLD AUTO: 4.7 K/UL
NEUTROPHILS NFR BLD: 61.4 %
NITRITE UR QL STRIP: NEGATIVE
OSMOLALITY UR: 217 MOSM/KG
PH UR STRIP: 7 [PH] (ref 5–8)
PHOSPHATE SERPL-MCNC: 3.6 MG/DL
PLATELET # BLD AUTO: 306 K/UL
PMV BLD AUTO: 10.2 FL
POTASSIUM SERPL-SCNC: 5 MMOL/L
PROT SERPL-MCNC: 7.2 G/DL
PROT UR QL STRIP: NEGATIVE
RBC # BLD AUTO: 5.58 M/UL
SODIUM SERPL-SCNC: 125 MMOL/L
SODIUM UR-SCNC: 54 MMOL/L
SP GR UR STRIP: 1 (ref 1–1.03)
T4 FREE SERPL-MCNC: 1.17 NG/DL
URN SPEC COLLECT METH UR: NORMAL
UROBILINOGEN UR STRIP-ACNC: NEGATIVE EU/DL
WBC # BLD AUTO: 7.62 K/UL

## 2017-06-12 PROCEDURE — 99284 EMERGENCY DEPT VISIT MOD MDM: CPT | Mod: ,,, | Performed by: EMERGENCY MEDICINE

## 2017-06-12 PROCEDURE — 85025 COMPLETE CBC W/AUTO DIFF WBC: CPT

## 2017-06-12 PROCEDURE — 84439 ASSAY OF FREE THYROXINE: CPT

## 2017-06-12 PROCEDURE — 81003 URINALYSIS AUTO W/O SCOPE: CPT

## 2017-06-12 PROCEDURE — 25000003 PHARM REV CODE 250: Performed by: EMERGENCY MEDICINE

## 2017-06-12 PROCEDURE — 84100 ASSAY OF PHOSPHORUS: CPT

## 2017-06-12 PROCEDURE — 96365 THER/PROPH/DIAG IV INF INIT: CPT

## 2017-06-12 PROCEDURE — 82570 ASSAY OF URINE CREATININE: CPT

## 2017-06-12 PROCEDURE — 84300 ASSAY OF URINE SODIUM: CPT

## 2017-06-12 PROCEDURE — 99223 1ST HOSP IP/OBS HIGH 75: CPT | Mod: AI,GC,, | Performed by: HOSPITALIST

## 2017-06-12 PROCEDURE — 11000001 HC ACUTE MED/SURG PRIVATE ROOM

## 2017-06-12 PROCEDURE — 83935 ASSAY OF URINE OSMOLALITY: CPT

## 2017-06-12 PROCEDURE — 63600175 PHARM REV CODE 636 W HCPCS: Performed by: STUDENT IN AN ORGANIZED HEALTH CARE EDUCATION/TRAINING PROGRAM

## 2017-06-12 PROCEDURE — 25000003 PHARM REV CODE 250: Performed by: STUDENT IN AN ORGANIZED HEALTH CARE EDUCATION/TRAINING PROGRAM

## 2017-06-12 PROCEDURE — 96366 THER/PROPH/DIAG IV INF ADDON: CPT

## 2017-06-12 PROCEDURE — 80053 COMPREHEN METABOLIC PANEL: CPT

## 2017-06-12 PROCEDURE — 83735 ASSAY OF MAGNESIUM: CPT

## 2017-06-12 PROCEDURE — 99285 EMERGENCY DEPT VISIT HI MDM: CPT | Mod: 25

## 2017-06-12 RX ORDER — ALPRAZOLAM 0.25 MG/1
0.25 TABLET ORAL
Status: COMPLETED | OUTPATIENT
Start: 2017-06-12 | End: 2017-06-12

## 2017-06-12 RX ORDER — THYROID 30 MG/1
30 TABLET ORAL DAILY
Status: DISCONTINUED | OUTPATIENT
Start: 2017-06-13 | End: 2017-06-13 | Stop reason: HOSPADM

## 2017-06-12 RX ORDER — DULOXETIN HYDROCHLORIDE 30 MG/1
30 CAPSULE, DELAYED RELEASE ORAL DAILY
Status: DISCONTINUED | OUTPATIENT
Start: 2017-06-13 | End: 2017-06-13 | Stop reason: HOSPADM

## 2017-06-12 RX ORDER — ACETAMINOPHEN 325 MG/1
650 TABLET ORAL EVERY 4 HOURS PRN
Status: DISCONTINUED | OUTPATIENT
Start: 2017-06-12 | End: 2017-06-13 | Stop reason: HOSPADM

## 2017-06-12 RX ORDER — SODIUM CHLORIDE 0.9 % (FLUSH) 0.9 %
3 SYRINGE (ML) INJECTION EVERY 8 HOURS
Status: DISCONTINUED | OUTPATIENT
Start: 2017-06-12 | End: 2017-06-13 | Stop reason: HOSPADM

## 2017-06-12 RX ORDER — SIMETHICONE 80 MG
2 TABLET,CHEWABLE ORAL
Status: DISCONTINUED | OUTPATIENT
Start: 2017-06-12 | End: 2017-06-13 | Stop reason: HOSPADM

## 2017-06-12 RX ORDER — DULOXETIN HYDROCHLORIDE 30 MG/1
30 CAPSULE, DELAYED RELEASE ORAL DAILY
Qty: 30 CAPSULE | Refills: 11 | Status: SHIPPED | OUTPATIENT
Start: 2017-06-12 | End: 2017-06-14 | Stop reason: SDUPTHER

## 2017-06-12 RX ORDER — MONTELUKAST SODIUM 10 MG/1
10 TABLET ORAL
Status: DISCONTINUED | OUTPATIENT
Start: 2017-06-12 | End: 2017-06-13 | Stop reason: HOSPADM

## 2017-06-12 RX ORDER — ZOLPIDEM TARTRATE 5 MG/1
5 TABLET ORAL NIGHTLY
Status: DISCONTINUED | OUTPATIENT
Start: 2017-06-12 | End: 2017-06-13 | Stop reason: HOSPADM

## 2017-06-12 RX ORDER — AMOXICILLIN 250 MG
1 CAPSULE ORAL 2 TIMES DAILY
Status: DISCONTINUED | OUTPATIENT
Start: 2017-06-12 | End: 2017-06-13 | Stop reason: HOSPADM

## 2017-06-12 RX ORDER — TRAMADOL HYDROCHLORIDE 50 MG/1
50 TABLET ORAL
Status: COMPLETED | OUTPATIENT
Start: 2017-06-12 | End: 2017-06-12

## 2017-06-12 RX ORDER — FLUTICASONE PROPIONATE 50 MCG
2 SPRAY, SUSPENSION (ML) NASAL DAILY
Status: DISCONTINUED | OUTPATIENT
Start: 2017-06-13 | End: 2017-06-13 | Stop reason: HOSPADM

## 2017-06-12 RX ORDER — SODIUM CHLORIDE 9 MG/ML
1000 INJECTION, SOLUTION INTRAVENOUS CONTINUOUS
Status: ACTIVE | OUTPATIENT
Start: 2017-06-12 | End: 2017-06-13

## 2017-06-12 RX ORDER — ALPRAZOLAM 0.25 MG/1
0.25 TABLET ORAL 3 TIMES DAILY
Status: DISCONTINUED | OUTPATIENT
Start: 2017-06-12 | End: 2017-06-12

## 2017-06-12 RX ORDER — LISINOPRIL 20 MG/1
40 TABLET ORAL DAILY
Status: DISCONTINUED | OUTPATIENT
Start: 2017-06-13 | End: 2017-06-13 | Stop reason: HOSPADM

## 2017-06-12 RX ORDER — TRAMADOL HYDROCHLORIDE 50 MG/1
50 TABLET ORAL EVERY 6 HOURS PRN
Status: DISCONTINUED | OUTPATIENT
Start: 2017-06-12 | End: 2017-06-13 | Stop reason: HOSPADM

## 2017-06-12 RX ORDER — ONDANSETRON 8 MG/1
8 TABLET, ORALLY DISINTEGRATING ORAL EVERY 8 HOURS PRN
Status: DISCONTINUED | OUTPATIENT
Start: 2017-06-12 | End: 2017-06-13 | Stop reason: HOSPADM

## 2017-06-12 RX ORDER — ENOXAPARIN SODIUM 100 MG/ML
40 INJECTION SUBCUTANEOUS EVERY 24 HOURS
Status: DISCONTINUED | OUTPATIENT
Start: 2017-06-12 | End: 2017-06-13 | Stop reason: HOSPADM

## 2017-06-12 RX ORDER — ALPRAZOLAM 0.25 MG/1
0.25 TABLET ORAL 3 TIMES DAILY PRN
Status: DISCONTINUED | OUTPATIENT
Start: 2017-06-12 | End: 2017-06-13 | Stop reason: HOSPADM

## 2017-06-12 RX ORDER — NYSTATIN 100000 [USP'U]/ML
500000 SUSPENSION ORAL ONCE
Status: COMPLETED | OUTPATIENT
Start: 2017-06-12 | End: 2017-06-12

## 2017-06-12 RX ADMIN — ALPRAZOLAM 0.25 MG: 0.25 TABLET ORAL at 04:06

## 2017-06-12 RX ADMIN — NYSTATIN 500000 UNITS: 500000 SUSPENSION ORAL at 10:06

## 2017-06-12 RX ADMIN — STANDARDIZED SENNA CONCENTRATE AND DOCUSATE SODIUM 1 TABLET: 8.6; 5 TABLET, FILM COATED ORAL at 10:06

## 2017-06-12 RX ADMIN — Medication 3 ML: at 10:06

## 2017-06-12 RX ADMIN — ALPRAZOLAM 0.25 MG: 0.25 TABLET ORAL at 10:06

## 2017-06-12 RX ADMIN — TRAMADOL HYDROCHLORIDE 50 MG: 50 TABLET, FILM COATED ORAL at 04:06

## 2017-06-12 RX ADMIN — SODIUM CHLORIDE, SODIUM LACTATE, POTASSIUM CHLORIDE, AND CALCIUM CHLORIDE 1000 ML: .6; .31; .03; .02 INJECTION, SOLUTION INTRAVENOUS at 03:06

## 2017-06-12 RX ADMIN — ENOXAPARIN SODIUM 40 MG: 100 INJECTION SUBCUTANEOUS at 10:06

## 2017-06-12 RX ADMIN — TRAMADOL HYDROCHLORIDE 50 MG: 50 TABLET, FILM COATED ORAL at 10:06

## 2017-06-12 RX ADMIN — SIMETHICONE CHEW TAB 80 MG 160 MG: 80 TABLET ORAL at 10:06

## 2017-06-12 RX ADMIN — ZOLPIDEM TARTRATE 5 MG: 5 TABLET, FILM COATED ORAL at 10:06

## 2017-06-12 RX ADMIN — SODIUM CHLORIDE 1000 ML: 0.9 INJECTION, SOLUTION INTRAVENOUS at 06:06

## 2017-06-12 NOTE — TELEPHONE ENCOUNTER
Hi, I see that she is at the ALS clinic on 6/14 Wednesday at 8am, I do not see patients on Wednesday mornings. Is there another day she can come in soon?    Please let her know that one of her thyroid levels is slightly abnormal and the other is normal. It is not like that her thyroid is causing her any symptoms or fatigue at this time.    Thank you, Ismael Sanchez

## 2017-06-12 NOTE — ED NOTES
Pt placed on bedpan, was unable to urinate at this time. States she will try again later. Perineal care provided.

## 2017-06-12 NOTE — ED PROVIDER NOTES
"Encounter Date: 6/12/2017       History     Chief Complaint   Patient presents with    Shortness of Breath    Abdominal Pain     Review of patient's allergies indicates:  No Known Allergies  Ms Edmonds is a 81 y F w/ Hx ALS, Hypothyroid, HTN who presents with abdominal "bloating" and worsening constipation for the past several weeks. She reports chronic issues with constipation, takes sennakot 2-3 times daily. Her last BM was this morning, without acute abnormality. She has very poor appetite that is also worsening with time, but does her best to keep eating and drinking. She denies any pain. Her chief complaint lists SOB, however, she denies this is an issue, says she sometimes has some trouble with her breathing in the upright position due to the fullness she feels in her abdomen.           Past Medical History:   Diagnosis Date    ALS (amyotrophic lateral sclerosis)     Breast cancer 2011    Breast cancer - lumpectomy, radiation, no chemo; L    Hypertension     Hypothyroid      Past Surgical History:   Procedure Laterality Date    SPINE SURGERY  2015    L4-5 laminectomy     Family History   Problem Relation Age of Onset    Cancer Mother     Cancer Father     Cancer Sister     Cancer Daughter      Social History   Substance Use Topics    Smoking status: Former Smoker     Years: 5.00    Smokeless tobacco: Not on file    Alcohol use 8.4 oz/week     14 Glasses of wine per week      Comment: 1 glass per day     Review of Systems   Constitutional: Positive for appetite change (worsening with time). Negative for chills, diaphoresis and fever.   HENT: Positive for trouble swallowing (slowly worsening with time).    Respiratory: Negative for shortness of breath.    Cardiovascular: Negative for chest pain and leg swelling.   Gastrointestinal: Positive for abdominal distention and constipation. Negative for abdominal pain, blood in stool, diarrhea, nausea and vomiting.   Endocrine: Negative for polydipsia, " polyphagia and polyuria.   Genitourinary: Negative for decreased urine volume, difficulty urinating, flank pain, frequency and hematuria.   Musculoskeletal: Negative for back pain.   Skin: Negative for rash.   Neurological: Positive for weakness (generalized). Negative for syncope.       Physical Exam     Initial Vitals [06/12/17 1317]   BP Pulse Resp Temp SpO2   136/82 95 18 97.8 °F (36.6 °C) 96 %     Physical Exam    Nursing note and vitals reviewed.  Constitutional: She is not diaphoretic. No distress.   HENT:   Head: Normocephalic and atraumatic.   Dry MM. Resolving thrush.    Eyes: Conjunctivae are normal. No scleral icterus.   Neck: Normal range of motion. Neck supple.   Cardiovascular: Normal rate, regular rhythm, normal heart sounds and intact distal pulses.   Pulmonary/Chest: Breath sounds normal. No stridor. No respiratory distress. She has no wheezes. She has no rhonchi. She has no rales.   Abdominal: Soft. She exhibits no distension. Bowel sounds are decreased. There is no tenderness. There is no rebound and no guarding.   Musculoskeletal: Normal range of motion. She exhibits no edema.   Neurological: She is alert and oriented to person, place, and time.   Skin: Skin is warm and dry. Capillary refill takes less than 2 seconds.         ED Course   Procedures  Labs Reviewed   CBC W/ AUTO DIFFERENTIAL - Abnormal; Notable for the following:        Result Value    RBC 5.58 (*)     Hemoglobin 16.6 (*)     All other components within normal limits   COMPREHENSIVE METABOLIC PANEL - Abnormal; Notable for the following:     Sodium 125 (*)     Chloride 90 (*)     All other components within normal limits   MAGNESIUM   PHOSPHORUS   URINALYSIS, REFLEX TO URINE CULTURE   T4, FREE   CREATININE, URINE, RANDOM   OSMOLALITY, URINE RANDOM   SODIUM, URINE, RANDOM   CREATININE, URINE, RANDOM    Narrative:     ADD-ON CREATININE URINE RANDOM #[id number]; OSMOLALITY URINE   #[id number]; SODIUM URINE RANDOM #[id number] PER ANY  PERRY TAPIA MD   18:10  06/12/2017    SODIUM, URINE, RANDOM    Narrative:     ADD-ON CREATININE URINE RANDOM #[id number]; OSMOLALITY URINE   #[id number]; SODIUM URINE RANDOM #[id number] PER ANY TAPIA MD   18:10  06/12/2017    OSMOLALITY, URINE RANDOM    Narrative:     ADD-ON CREATININE URINE RANDOM #[id number]; OSMOLALITY URINE   #[id number]; SODIUM URINE RANDOM #[id number] PER ANY TAPIA MD   18:10  06/12/2017    TSH          X-Rays:   Independently Interpreted Readings:   Abdomen: Nonspecific bowel gas.  No free air under diaphragm.  No air fluid levels or signs of obstruction.                  Attending Attestation:   Physician Attestation Statement for Resident:  As the supervising MD   Physician Attestation Statement: I have personally seen and examined this patient.   I agree with the above history. -:   As the supervising MD I agree with the above PE.    As the supervising MD I agree with the above treatment, course, plan, and disposition.   -: Worsening weakness/fatigue/bloating in pt w/ als, r/o obstruction, constipation, electrolyte disorder, anemia, infectious, progression of underlying neuro dz, other.    I have reviewed and agree with the residents interpretation of the following: lab data.               HO4 Note:   DDX: Constipation v Autonomic Dysfunction v Partial Obstruction   Evaluate for electrolyte dysfunction given poor PO intake and clinical dehydration on exam  R/O thyroid dysfunction   No acute or total obstruction suspected based on lack of abdominal pain, tenderness, or distension on exam   No acute abdomen   Obtained basic labs, electrolytes, thyroid labs, AXR  AXR shows mild diffuse distension. No indication for emergent CT imaging.   Labs returned notable for hyponatremia  She received 1L LR on arrival. Adding NS @ 75/hr.   Admitting to medicine.   Sindy Holland MD 6/12/17 5:54PM        ED Course     Clinical Impression:   The primary encounter diagnosis was Hyponatremia. A  diagnosis of Constipation was also pertinent to this visit.          Nilay Camacho MD  06/12/17 2303

## 2017-06-12 NOTE — ED NOTES
Pt presents with abd pain and bloating x 4 weeks and is getting progressively worse. Pt also reports SOB r/t bloating. Pt states she believes theses are symptome r/t ALS, which she was dx with last August. Denies fever, N/V/D. Reports decreased appetite and weight loss of 4lbs in 1 month.     LOC: The patient is awake, alert and aware of environment with an appropriate affect, the patient is oriented x 3 and speaking appropriately.  APPEARANCE: Patient resting comfortably and in no acute distress, patient is clean and well groomed  SKIN: The skin is warm and dry, color consistent with ethnicity, patient has normal skin turgor and moist mucus membranes, skin intact, no breakdown or brusing noted.  MUSCULOSKELETAL: Patient moving all extremities well, no obvious swelling or deformities noted. Reports generalized weakness and back pain.   RESPIRATORY: Airway is open and patent, breath sounds clear throughout all lung fields; respirations are spontaneous, patient has a normal effort and rate, no accessory muscle use noted. Pt reports SOB r/t abd distention.   CARDIAC: Patient has no peripheral edema noted, capillary refill < 3 seconds. No complaints of chest pain   ABDOMEN: Soft and non tender to palpation, no distention noted. Bowel sounds present x 4. Pt reports bloating, abd is flat.   NEUROLOGIC: PERRL, 3mm bilaterally, eyes open spontaneously, behavior appropriate to situation, follows commands, facial expression symmetrical, bilateral hand grasp equal and even, purposeful motor response noted, normal sensation in all extremities when touched with a finger.

## 2017-06-13 VITALS
WEIGHT: 116 LBS | BODY MASS INDEX: 18.64 KG/M2 | RESPIRATION RATE: 20 BRPM | DIASTOLIC BLOOD PRESSURE: 90 MMHG | HEART RATE: 78 BPM | SYSTOLIC BLOOD PRESSURE: 191 MMHG | HEIGHT: 66 IN | OXYGEN SATURATION: 98 % | TEMPERATURE: 98 F

## 2017-06-13 LAB
ANION GAP SERPL CALC-SCNC: 7 MMOL/L
BUN SERPL-MCNC: 8 MG/DL
CALCIUM SERPL-MCNC: 7.5 MG/DL
CHLORIDE SERPL-SCNC: 104 MMOL/L
CO2 SERPL-SCNC: 23 MMOL/L
CREAT SERPL-MCNC: 0.5 MG/DL
EST. GFR  (AFRICAN AMERICAN): >60 ML/MIN/1.73 M^2
EST. GFR  (NON AFRICAN AMERICAN): >60 ML/MIN/1.73 M^2
GLUCOSE SERPL-MCNC: 80 MG/DL
MAGNESIUM SERPL-MCNC: 1.1 MG/DL
PHOSPHATE SERPL-MCNC: 2.8 MG/DL
POTASSIUM SERPL-SCNC: 3 MMOL/L
SODIUM SERPL-SCNC: 134 MMOL/L

## 2017-06-13 PROCEDURE — 25000003 PHARM REV CODE 250: Performed by: STUDENT IN AN ORGANIZED HEALTH CARE EDUCATION/TRAINING PROGRAM

## 2017-06-13 PROCEDURE — 83735 ASSAY OF MAGNESIUM: CPT

## 2017-06-13 PROCEDURE — 97530 THERAPEUTIC ACTIVITIES: CPT

## 2017-06-13 PROCEDURE — 25000003 PHARM REV CODE 250: Performed by: EMERGENCY MEDICINE

## 2017-06-13 PROCEDURE — 97162 PT EVAL MOD COMPLEX 30 MIN: CPT

## 2017-06-13 PROCEDURE — 36415 COLL VENOUS BLD VENIPUNCTURE: CPT

## 2017-06-13 PROCEDURE — 99238 HOSP IP/OBS DSCHRG MGMT 30/<: CPT | Mod: GC,,, | Performed by: HOSPITALIST

## 2017-06-13 PROCEDURE — 84100 ASSAY OF PHOSPHORUS: CPT

## 2017-06-13 PROCEDURE — 80048 BASIC METABOLIC PNL TOTAL CA: CPT

## 2017-06-13 PROCEDURE — 63600175 PHARM REV CODE 636 W HCPCS: Performed by: STUDENT IN AN ORGANIZED HEALTH CARE EDUCATION/TRAINING PROGRAM

## 2017-06-13 PROCEDURE — G8988 SELF CARE GOAL STATUS: HCPCS | Mod: CK

## 2017-06-13 PROCEDURE — 97166 OT EVAL MOD COMPLEX 45 MIN: CPT | Mod: 59

## 2017-06-13 PROCEDURE — G8989 SELF CARE D/C STATUS: HCPCS | Mod: CL

## 2017-06-13 PROCEDURE — G8987 SELF CARE CURRENT STATUS: HCPCS | Mod: CL

## 2017-06-13 RX ORDER — IBUPROFEN 800 MG/1
800 TABLET ORAL 3 TIMES DAILY PRN
COMMUNITY

## 2017-06-13 RX ORDER — AMOXICILLIN 250 MG
1 CAPSULE ORAL 2 TIMES DAILY
COMMUNITY
Start: 2017-06-13

## 2017-06-13 RX ORDER — LACTULOSE 10 G/15ML
20 SOLUTION ORAL EVERY 6 HOURS PRN
Qty: 900 ML | Refills: 0 | Status: SHIPPED | OUTPATIENT
Start: 2017-06-13 | End: 2017-06-23

## 2017-06-13 RX ORDER — AMLODIPINE BESYLATE 10 MG/1
10 TABLET ORAL DAILY
Qty: 60 TABLET | Refills: 6 | Status: SHIPPED | OUTPATIENT
Start: 2017-06-13 | End: 2018-06-13

## 2017-06-13 RX ORDER — AMLODIPINE BESYLATE 10 MG/1
10 TABLET ORAL DAILY
Status: DISCONTINUED | OUTPATIENT
Start: 2017-06-13 | End: 2017-06-13 | Stop reason: HOSPADM

## 2017-06-13 RX ORDER — LANOLIN ALCOHOL/MO/W.PET/CERES
400 CREAM (GRAM) TOPICAL ONCE
Status: COMPLETED | OUTPATIENT
Start: 2017-06-13 | End: 2017-06-13

## 2017-06-13 RX ORDER — MAGNESIUM SULFATE HEPTAHYDRATE 40 MG/ML
2 INJECTION, SOLUTION INTRAVENOUS
Status: COMPLETED | OUTPATIENT
Start: 2017-06-13 | End: 2017-06-13

## 2017-06-13 RX ADMIN — TRAMADOL HYDROCHLORIDE 50 MG: 50 TABLET, FILM COATED ORAL at 10:06

## 2017-06-13 RX ADMIN — AMLODIPINE BESYLATE 10 MG: 10 TABLET ORAL at 01:06

## 2017-06-13 RX ADMIN — Medication 3 ML: at 02:06

## 2017-06-13 RX ADMIN — STANDARDIZED SENNA CONCENTRATE AND DOCUSATE SODIUM 1 TABLET: 8.6; 5 TABLET, FILM COATED ORAL at 09:06

## 2017-06-13 RX ADMIN — POTASSIUM BICARBONATE 50 MEQ: 25 TABLET, EFFERVESCENT ORAL at 01:06

## 2017-06-13 RX ADMIN — LISINOPRIL 40 MG: 20 TABLET ORAL at 09:06

## 2017-06-13 RX ADMIN — SODIUM CHLORIDE 1000 ML: 0.9 INJECTION, SOLUTION INTRAVENOUS at 03:06

## 2017-06-13 RX ADMIN — Medication 10 ML: at 06:06

## 2017-06-13 RX ADMIN — MAGNESIUM SULFATE IN WATER 2 G: 40 INJECTION, SOLUTION INTRAVENOUS at 01:06

## 2017-06-13 RX ADMIN — MAGNESIUM OXIDE TAB 400 MG (241.3 MG ELEMENTAL MG) 400 MG: 400 (241.3 MG) TAB at 09:06

## 2017-06-13 RX ADMIN — Medication 10 ML: at 01:06

## 2017-06-13 RX ADMIN — FLUTICASONE PROPIONATE 2 SPRAY: 50 SPRAY, METERED NASAL at 09:06

## 2017-06-13 RX ADMIN — SIMETHICONE CHEW TAB 80 MG 160 MG: 80 TABLET ORAL at 09:06

## 2017-06-13 RX ADMIN — Medication 3 ML: at 06:06

## 2017-06-13 RX ADMIN — Medication 10 ML: at 12:06

## 2017-06-13 RX ADMIN — MAGNESIUM SULFATE IN WATER 2 G: 40 INJECTION, SOLUTION INTRAVENOUS at 09:06

## 2017-06-13 RX ADMIN — DULOXETINE 30 MG: 30 CAPSULE, DELAYED RELEASE ORAL at 10:06

## 2017-06-13 RX ADMIN — LEVOTHYROXINE, LIOTHYRONINE 30 MG: 19; 4.5 TABLET ORAL at 10:06

## 2017-06-13 RX ADMIN — ALPRAZOLAM 0.25 MG: 0.25 TABLET ORAL at 10:06

## 2017-06-13 RX ADMIN — SIMETHICONE CHEW TAB 80 MG 160 MG: 80 TABLET ORAL at 01:06

## 2017-06-13 RX ADMIN — POTASSIUM BICARBONATE 50 MEQ: 25 TABLET, EFFERVESCENT ORAL at 09:06

## 2017-06-13 NOTE — PLAN OF CARE
CRISTA called and updated Jessika RN with [facility]; also called and updated Elisha RN with [facility] . Aide services on orders but patient gets assistance through [facility] so service not needed. Elisha verbalized understanding. Face sheet, H&P, and orders sent to [facility] via , and faxed to [facility] [phone]. Patient ready for discharge.

## 2017-06-13 NOTE — PLAN OF CARE
Problem: Occupational Therapy Goal  Goal: Occupational Therapy Goal  Goals to be met by: 6-20-17    Patient will increase functional independence with ADLs by performing:    UE Dressing with Stand-by Assistance.  LE Dressing with Maximum Assistance.  Toileting from bed level with Minimal Assistance for hygiene and clothing management.   Sitting at edge of bed x15 minutes with Minimal Assistance.  Rolling to Bilateral with Moderate Assistance.   Supine to sit with Moderate Assistance.      Eval performed, POC set.    STEFAN Edwards  6/13/2017

## 2017-06-13 NOTE — ASSESSMENT & PLAN NOTE
- Continue lisinopril 40 mg PO daily.  - Holding Maxzide PO daily considering contribution to patient's hyponatremia.

## 2017-06-13 NOTE — ASSESSMENT & PLAN NOTE
- Consider nutrition consult and possible mirtazapine (on Ambien currently) to cover insomnia as well.

## 2017-06-13 NOTE — ASSESSMENT & PLAN NOTE
- Likely multifactorial, with large contribution of hypovolemia hyponatremia from decreased PO intake past month. Contribution of HCTZ component in combination HTN medication (Urine Sodium 54>40 indicating renal losses). TSH mildly elevated in recent labs last month but with normal free T4 - may have mild hypothyroidism contribution.   - Urine osmolality at low end of normal at 217, likely hypovolemia.  - Given 1L IVF in ED and continued on NS infusion at 75 ml/hr.  - Daily BMP.

## 2017-06-13 NOTE — DISCHARGE SUMMARY
DISCHARGE SUMMARY  Hospital Medicine    Team: [facility]OS[facility] MED 3    Patient Name: Jaelyn Edmonds  YOB: 1935    Admit Date: 6/12/2017    Discharge Date: 06/13/2017    Discharge Attending Physician: Jennie Miller MD     Diagnoses:  Active Hospital Problems    Diagnosis  POA    *Hyponatremia [E87.1]  Yes     Priority: 1 - High    Chronic constipation [K59.09]  Yes     Priority: 2     ALS (amyotrophic lateral sclerosis) [G12.21]  Yes     Priority: 3     Essential hypertension [I10]  Yes     Priority: 4     Chronic rhinitis [J31.0]  Yes     Priority: 5     Hypothyroidism [E03.9]  Yes     Priority: 6     Thrush [B37.0]  Yes    Poor appetite [R63.0]  Yes      Resolved Hospital Problems    Diagnosis Date Resolved POA   No resolved problems to display.       Physical Exam   Constitutional: She is oriented to person, place, and time. She appears well-developed and well-nourished. No distress.   HENT:   Head: Normocephalic and atraumatic.   Eyes: EOM are normal. Pupils are equal, round, and reactive to light.   Neck: Normal range of motion. No JVD present.   Cardiovascular: Normal rate, regular rhythm and normal heart sounds.    Pulmonary/Chest: Effort normal and breath sounds normal. No respiratory distress. She has no wheezes.   Abdominal: Soft. Bowel sounds are normal. She exhibits no distension. There is no tenderness.   Musculoskeletal:   Paralyzed from waist down; sensation intact.    Neurological: She is alert and oriented to person, place, and time.   Skin: Skin is warm and dry. Capillary refill takes less than 2 seconds. She is not diaphoretic.   Psychiatric: She has a normal mood and affect. Her behavior is normal. Thought content normal.     Discharged Condition: admit problems have resolved    HOSPITAL COURSE:    Initial Presentation:  Jaelyn Edmonds is a 81 year old female with a medical history significant for ALD, essential hypertension, chronic rhinitis who presents to the ED on 6/12  with symptoms of abdominal bloating and post-prandial abdominal discomfort related to her constipation that has gotten progressively worse the past 4-5 weeks along with increasingly worsening poor appetite. During this time, patient has felt more fatigued and has had some mental fogginess, but denies any seizure symptoms. Of note, patient does take triamterene-HCTZ combination for blood pressure along with lisinopril. Upon check of basic labs in the ED here, patient found to have hyponatremia with sodium of 125 (note last sodium checked was 124 on 5/26). Patient states she takes Senakot 2-3 times daily with only mild relief of her symptoms, which she states is worse prior and right after she eats. She states that there is moderate relief when she has had a bowel movement but it is only finite. She otherwise denies nausea or vomiting. Patient states the past several weeks the abdominal pain is so uncomfortable that she finds herself working to breath especially when she sits up as she feels the fullness in her abdomen.     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 Patient still has lower extremity sensation. She resides in [facility] with her . Exercises sporadically with a trapeze.     Course of Principle Problem for Admission:  In the ED, patient was given bolus of 1L NS and started on 75 ml/hr drip for 12 hours. Urine sodium was high at 54, attributable likely to renal losses secondary to recent diuretic use with Maxzide. The following morning, patient's sodium had resolved to 134 and IVF was stopped. Patient did report of one small bowel movement on 6/12 and stated the morning of 6/13 significantly better with less weakness and discomfort. She was discharged with instructions to stop Maxzide for hypertension control considering its likely contribution to patient's hyponatremia. Amlodipine 10 mg PO daily was  added. Patient was recommended to follow-up with PCP in several weeks. Furthermore, GI referral was made for further evaluation of patient's constipation; she was sent back to home with PT/OT and given bowel regimen of Senna-Docusate to be taken twice daily with lactulose to be used PRN.       CONSULTS:   None    Other Pertinent Lab Findings:       6/12/2017 14:44 6/13/2017 05:15   Sodium 125 (L) 134 (L)       Pertinent/Significant Diagnostic Studies:    Imaging Results          X-Ray Abdomen Flat And Erect (Final result)  Result time 06/12/17 16:44:14    Final result by Jorge Rizvi MD (06/12/17 16:44:14)                 Impression:     See above      Electronically signed by: Jorge Rizvi MD  Date:     06/12/17  Time:    16:44              Narrative:    2 views    No free air in the abdomen.  Slight degree of  diffuse bowel dilatation involving the colon and small bowel is identified.  Thoracolumbar scoliosis.                              Special Treatments/Procedures:  - IV NS infusion     Disposition:  Assisted Living Home    Future Scheduled Appointments:  Future Appointments  Date Time Provider Department Center   6/21/2017 8:00 AM ALS, CLINIC [facility]       Follow-up Plans from This Hospitalization:  You were admitted to the hospital due to low sodium in your blood, which can prone your to weakness, fatigue, confusion, and seizures if it becomes too low. We have very high suspicion this was secondary to two reasons, your low appetite as well as one of your blood pressure medications - the Maxzide, which acts as a diuretic and can cause electrolyte disturbances.    Your sodium today has corrected to normal as result of stopping that medication and giving you IV fluid for replenishment. For your blood pressure, we recommend starting on another blood pressure medication called AMLODIPINE. Please take 10 mg by mouth daily. The main side effect is lower extremity edema in your feet, which occurs  approximately 10% of the time. STOP the Maxzide.    Otherwise, for your abdominal discomfort, bloating, and constipation. We would recommend over-the-counter Kathy-Colace, which acts as both a laxative + a stool softener. Take one tablet TWICE a day. You can continue your Senakot as well.    We have also prescribed your a good laxative for your constipation called lactulose that you can take up to four times per day, as needed. Take two Tablespoons (30 ml) at a time for relief of constipation.      Last CBC/BMP/HgbA1c (if applicable):  Recent Results (from the past 336 hour(s))   CBC auto differential    Collection Time: 06/12/17  2:44 PM   Result Value Ref Range    WBC 7.62 3.90 - 12.70 K/uL    Hemoglobin 16.6 (H) 12.0 - 16.0 g/dL    Hematocrit 47.2 37.0 - 48.5 %    Platelets 306 150 - 350 K/uL     Recent Results (from the past 336 hour(s))   Basic metabolic panel    Collection Time: 06/13/17  5:15 AM   Result Value Ref Range    Sodium 134 (L) 136 - 145 mmol/L    Potassium 3.0 (L) 3.5 - 5.1 mmol/L    Chloride 104 95 - 110 mmol/L    CO2 23 23 - 29 mmol/L    BUN, Bld 8 8 - 23 mg/dL    Creatinine 0.5 0.5 - 1.4 mg/dL    Calcium 7.5 (L) 8.7 - 10.5 mg/dL    Anion Gap 7 (L) 8 - 16 mmol/L     No results found for: HGBA1C    Discharge Medication List:     Medication List      START taking these medications    amlodipine 10 MG tablet  Commonly known as:  NORVASC  Take 1 tablet (10 mg total) by mouth once daily.     duloxetine 30 MG capsule  Commonly known as:  CYMBALTA  Take 1 capsule (30 mg total) by mouth once daily.     lactulose 20 gram/30 mL Soln  Commonly known as:  CHRONULAC  Take 30 mLs (20 g total) by mouth every 6 (six) hours as needed.     senna-docusate 8.6-50 mg 8.6-50 mg per tablet  Commonly known as:  PERICOLACE  Take 1 tablet by mouth 2 (two) times daily.        CHANGE how you take these medications    cyanocobalamin (vitamin B-12) 1,000 mcg/mL Kit  Inject 1,000 mcg as directed 3 (three) times a week.  What  changed:  when to take this     zolpidem 5 MG Tab  Commonly known as:  AMBIEN  Take 1 tablet (5 mg total) by mouth nightly as needed.  What changed:  reasons to take this        CONTINUE taking these medications    alprazolam 0.25 MG tablet  Commonly known as:  XANAX  Take 1 tablet (0.25 mg total) by mouth 3 (three) times daily.     ARMOUR THYROID Tab  Generic drug:  thyroid     cetirizine 10 MG tablet  Commonly known as:  ZYRTEC  Take 1 tablet (10 mg total) by mouth once daily.     CURCUMIN MISC     DUKE'S SOLUTION     fluticasone 50 mcg/actuation nasal spray  Commonly known as:  FLONASE  2 sprays by Each Nare route once daily.     glycerin adult suppository  Place 1 suppository rectally as needed for Constipation.     ibuprofen 800 MG tablet  Commonly known as:  ADVIL,MOTRIN     lisinopril 40 MG tablet  Commonly known as:  PRINIVIL,ZESTRIL  Take 1 tablet (40 mg total) by mouth once daily.     magnesium 30 mg Tab     multivitamin with iron-mineral Liqd     tramadol 50 mg tablet  Commonly known as:  ULTRAM  Take 1 tablet (50 mg total) by mouth 3 (three) times daily.        STOP taking these medications    triamterene-hydrochlorothiazide 37.5-25 mg 37.5-25 mg per tablet  Commonly known as:  MAXZIDE-25           Where to Get Your Medications      These medications were sent to [facility] - [place], [place] - [address], [place] [address]    Phone:  [phone]    amlodipine 10 MG tablet   lactulose 20 gram/30 mL Soln     These medications were sent to [id number] - [place]  [address] AT [facility]  [address] [address]    Phone:  [phone]    duloxetine 30 MG capsule     You can get these medications from any pharmacy    You don't need a prescription for these medications   senna-docusate 8.6-50 mg 8.6-50 mg per tablet         Patient Instructions:    Discharge Procedure  Orders  Ambulatory Referral to Gastroenterology   Referral Priority: Routine Referral Type: Consultation   Referral Reason: Specialty Services Required    Requested Specialty: Gastroenterology    Number of Visits Requested: 1      Diet general         Signing Physician:    Osiel Diggs MD  PGY-1 Internal Medicine  [phone]

## 2017-06-13 NOTE — PLAN OF CARE
4:11 PM  SW called [facility] [phone] to arrange transportation. Transportation will pick pt up as soon as possible.     Candace Fink, DAVIS    [phone]

## 2017-06-13 NOTE — ASSESSMENT & PLAN NOTE
- Senna-docusate PO BID and simethicone QID.  - Consider PRN enema for symptomatic relief.  - Abdominal XR unremarkable.   - Consider nutrition consult.

## 2017-06-13 NOTE — PT/OT/SLP EVAL
"Occupational Therapy  Evaluation / Treatment    Jaelyn Edmonds   MRN: [id number]   Admitting Diagnosis: Hyponatremia    OT Date of Treatment: 06/13/17   OT Start Time: 0905  OT Stop Time: 0940  OT Total Time (min): 35 min    Billable Minutes:  Evaluation 20  Therapeutic Activity 15    Diagnosis: Hyponatremia       Past Medical History:   Diagnosis Date    ALS (amyotrophic lateral sclerosis)     Breast cancer 2011    Breast cancer - lumpectomy, radiation, no chemo; L    Hypertension     Hypothyroid       Past Surgical History:   Procedure Laterality Date    SPINE SURGERY  2015    L4-5 laminectomy       Referring physician: Osiel Diggs MD  Date referred to OT: 6-12-17    General Precautions: Standard, fall  Orthopedic Precautions: N/A  Braces: N/A    Do you have any cultural, spiritual, Scientologist conflicts, given your current situation?: none stated     Patient History:  Living Environment  Lives With: spouse  Living Arrangements: assisted living  Home Layout: Able to live on 1st floor  Transportation Available: family or friend will provide  Living Environment Comment: Pt lives with her  at [facility] and states she has 24/7 assistance from aides.  [facility] and requiring assistance for bed mobility, LBD and bathing. Pt reports she was also recieving HHPT and HHST.   Equipment Currently Used at Home: bath bench, walker, rolling, bedside commode, wheelchair    Prior level of function:   Bed Mobility/Transfers: needs assist  Grooming: independent  Bathing: needs assist  Upper Body Dressing: independent  Lower Body Dressing: needs assist  Toileting: needs assist  Home Management Skills: needs assist  Mode of Transportation: Family, Friends     Dominant hand: right    Subjective:  Communicated with RN prior to session.  "I was getting HHPT and HHST but for some reason I wasn't receiving OT, I think it was something to do with the insurance."  Chief " Complaint: weakness  Patient/Family stated goals: return to assisted living facility    Pain/Comfort  Pain Rating 1: 0/10  Location - Side 1: Bilateral  Location 1:  (Pain upon movement of legs)  Pain Addressed 1: Reposition, Distraction, Cessation of Activity    Objective:  Patient found with: telemetry, pressure relief boots, peripheral IV    Cognitive Exam:  Oriented to: Person, Place and Situation  Follows Commands/attention: Follows multistep  commands  Communication: clear/fluent  Memory:  No Deficits noted  Safety awareness/insight to disability: impaired  Coping skills/emotional control: Appropriate to situation    Visual/perceptual:  Intact    Physical Exam:  Postural examination/scapula alignment: Rounded shoulder, Head forward and Scoliosis  Skin integrity: Visible skin intact  Edema: None noted     Sensation:   Intact    Upper Extremity Range of Motion:  Right Upper Extremity: WFL except for shoulder flexion (AROM aprox 0-90*; AAROM aprox 0-120*)  Left Upper Extremity: WFL except for shoulder flexion (AROM aprox 0-90*; AAROM aprox 0-120*)    Upper Extremity Strength:  Right Upper Extremity: WFL except Shoulder is 3/5; elbow is 4/5  Left Upper Extremity: WFL except shoulder is 3/5; eblow is 4/5   Strength: WFL    Fine motor coordination:   Impaired  Left hand, manipulation of objects and Right hand, manipulation of objects; declining speed and coordination with fine motor skills    Gross motor coordination: grossly WFL    Functional Mobility:  Bed Mobility:  Rolling/Turning to Left: Maximum assistance  Rolling/Turning Right: Maximum assistance  Scooting/Bridging: Total Assistance  Supine to Sit: Maximum Assistance  Sit to Supine: Maximum Assistance    Transfers:  Sit <> Stand Assistance: Activity did not occur    Functional Ambulation: N/A    Activities of Daily Living:  UE Dressing Level of Assistance: Minimum assistance (donning gown as robe in sitting)  LE Dressing Level of Assistance: Total  "assistance (donning socks)  Grooming Position: Seated, EOB  Grooming Level of Assistance:  (Set-up assistance)  Toileting Where Assessed: Bed level (on bedpan)  Toileting Level of Assistance: Moderate assistance (Pt able to clean front and back but did not have on LE clothing to assess whether pt can manage clothing over her bottom )    Balance:   Static Sit: FAIR-: Maintains without assist but inconsistent  maintains static sitting balance with CGA-SBA  Dynamic Sit: FAIR: Cannot move trunk without losing balance Requires Max A to regain balance during dynamic sitting activities  Static Stand: standing activity did not occur    Therapeutic Activities and Exercises:  OT evaluation performed.  White board updated.  Pt educated on role of OT, safety with functional mobility and ADLs  Worked on trunk control and sitting balance during functional activities at EOB. During static sit patient maintains without assist but inconsistently requiring CGA-SBA to regain balance. During dynamic sitting pt requires Max A to regain balance. Pt able to tolerate aprox 15 sitting EOB while performing functional tasks.       AM-PAC 6 CLICK ADL  How much help from another person does this patient currently need?  1 = Unable, Total/Dependent Assistance  2 = A lot, Maximum/Moderate Assistance  3 = A little, Minimum/Contact Guard/Supervision  4 = None, Modified [place]/Independent    Putting on and taking off regular lower body clothing? : 1  Bathing (including washing, rinsing, drying)?: 2  Toileting, which includes using toilet, bedpan, or urinal? : 2  Putting on and taking off regular upper body clothing?: 3  Taking care of personal grooming such as brushing teeth?: 3  Eating meals?: 3  Total Score: 14    AM-PAC Raw Score CMS "G-Code Modifier Level of Impairment Assistance   6 % Total / Unable   7 - 9 CM 80 - 100% Maximal Assist   10-14 CL 60 - 80% Moderate Assist   15 - 19 CK 40 - 60% Moderate Assist   20 - 22 CJ 20 - 40% " Minimal Assist   23 CI 1-20% SBA / CGA   24 CH 0% Independent/ Mod I       Patient left supine with all lines intact and call button in reach    Assessment:  Jaelyn Edmonds is a 81 y.o. female with a medical diagnosis of Hyponatremia and presents with performance deficits of Physical skills including impaired balance, functional mobility, strength, functional endurance, fine and gross motor coordination, and  functional use of (B) UE & LE. Pt is motivated, tolerated evaluation well and would continue to benefit from skilled OT intervention to work on impairments listed below in order to increase (I)ce, decrease caregiver burden and increase pt safety.    Pt presented with a moderate complexity OT evaluation.  Pt required an expanded an expanded review of medical history and occupational profile.  Pt demod 5+ performance deficits (physical, cognitive, or psychosocial) resulting in limitations and engagement restrictions.  Clinical decision making required moderate analytical complexity with multiple treatment options.  Pt with possible comorbidities and required minimal to moderate modification of task/assistance with assessment.      Physical- skills refer to impairments of body structure or functions, balance, mobility; strength, endurance, FMC, GMC, sensation, dexterity, and posture.  Cognitive- skills refer to ability to attend, communicate, perceive, think, understand, problem solve, mentally sequence, learn, and remember resulting in ability to organize occupational performance in timely and safe manner.    Psychosocial- skills refer to interpersonal interactions, habits, routines, and behaviors, active use of coping strategies, and environmental adaptations to appropriately participate in everyday tasks and situations.       Rehab identified problem list/impairments: Rehab identified problem list/impairments: weakness, impaired endurance, impaired self care skills, impaired functional mobilty, impaired  balance, decreased coordination, decreased upper extremity function, decreased lower extremity function, decreased ROM, decreased safety awareness, pain, impaired fine motor, impaired muscle length, impaired coordination    Rehab potential is good.    Activity tolerance: Good    Discharge recommendations: Discharge Facility/Level Of Care Needs: home health OT     Barriers to discharge: Barriers to Discharge: None    Equipment recommendations: other (see comments) (unclear of what type of bathing DME pt has, therapy recommending bath bench)     GOALS:    Occupational Therapy Goals        Problem: Occupational Therapy Goal    Goal Priority Disciplines Outcome Interventions   Occupational Therapy Goal     OT, PT/OT     Description:  Goals to be met by: 6-20-17    Patient will increase functional independence with ADLs by performing:    UE Dressing with Stand-by Assistance.  LE Dressing with Maximum Assistance.  Toileting from bed level with Minimal Assistance for hygiene and clothing management.   Sitting at edge of bed x15 minutes with Minimal Assistance.  Rolling to Bilateral with Moderate Assistance.   Supine to sit with Moderate Assistance.                        PLAN:  Patient to be seen 3 x/week to address the above listed problems via self-care/home management, therapeutic activities, therapeutic exercises  Plan of Care expires: 06/20/17  Plan of Care reviewed with: patient         STEFAN Edwards  06/13/2017

## 2017-06-13 NOTE — HPI
Jaelyn Edmonds is a 81 year old female with a medical history significant for ALD, essential hypertension, chronic rhinitis who presents to the ED on 6/12 with symptoms of abdominal bloating and post-prandial abdominal discomfort related to her constipation that has gotten progressively worse the past 4-5 weeks along with increasingly worsening poor appetite. During this time, patient has felt more fatigued and has had some mental fogginess, but denies any seizure symptoms. Of note, patient does take triamterene-HCTZ combination for blood pressure along with lisinopril. Upon check of basic labs in the ED here, patient found to have hyponatremia with sodium of 125 (note last sodium checked was 124 on 5/26). Patient states she takes Senakot 2-3 times daily with only mild relief of her symptoms, which she states is worse prior and right after she eats. She states that there is moderate relief when she has had a bowel movement but it is only finite. She otherwise denies nausea or vomiting. Patient states the past several weeks the abdominal pain is so uncomfortable that she finds herself working to breath especially when she sits up as she feels the fullness in her abdomen.     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 Patient still has lower extremity sensation. She resides in [facility] with her . Exercises sporadically with a trapeze.

## 2017-06-13 NOTE — H&P
[facility] Hospital Medicine  History & Physical    Patient Name: Jaelyn Edmonds  MRN: [id number]  Admission Date: 6/12/2017  Attending Physician: Jennie Miller MD   Primary Care Provider: Ismael Sanchez MD    [facility] Medicine Team: [facility] HOSP MED 3 Osiel Diggs MD     Patient information was obtained from patient, relative(s), past medical records and ER records.     Subjective:     Principal Problem:Hyponatremia    Chief Complaint:   Chief Complaint   Patient presents with    Shortness of Breath    Abdominal Pain        HPI: Jaelyn Edmodns is a 81 year old female with a medical history significant for ALD, essential hypertension, chronic rhinitis who presents to the ED on 6/12 with symptoms of abdominal bloating and post-prandial abdominal discomfort related to her constipation that has gotten progressively worse the past 4-5 weeks along with increasingly worsening poor appetite. During this time, patient has felt more fatigued and has had some mental fogginess, but denies any seizure symptoms. Of note, patient does take triamterene-HCTZ combination for blood pressure along with lisinopril. Upon check of basic labs in the ED here, patient found to have hyponatremia with sodium of 125 (note last sodium checked was 124 on 5/26). Patient states she takes Senakot 2-3 times daily with only mild relief of her symptoms, which she states is worse prior and right after she eats. She states that there is moderate relief when she has had a bowel movement but it is only finite. She otherwise denies nausea or vomiting. Patient states the past several weeks the abdominal pain is so uncomfortable that she finds herself working to breath especially when she sits up as she feels the fullness in her abdomen.     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 Patient still has lower extremity sensation. She resides in  [facility] with her . Exercises sporadically with a trapeze.     Past Medical History:   Diagnosis Date    ALS (amyotrophic lateral sclerosis)     Breast cancer 2011    Breast cancer - lumpectomy, radiation, no chemo; L    Hypertension     Hypothyroid        Past Surgical History:   Procedure Laterality Date    SPINE SURGERY  2015    L4-5 laminectomy       Review of patient's allergies indicates:  No Known Allergies    No current facility-administered medications on file prior to encounter.      Current Outpatient Prescriptions on File Prior to Encounter   Medication Sig    alprazolam (XANAX) 0.25 MG tablet Take 1 tablet (0.25 mg total) by mouth 3 (three) times daily.    ARMOUR THYROID 30 mg Tab Take 30 mg by mouth once daily.    cetirizine (ZYRTEC) 10 MG tablet Take 1 tablet (10 mg total) by mouth once daily.    cyanocobalamin, vitamin B-12, 1,000 mcg/mL Kit Inject 1,000 mcg as directed 3 (three) times a week. (Patient taking differently: Inject 1,000 mcg as directed every Mon, Wed, Fri. )    fluticasone (FLONASE) 50 mcg/actuation nasal spray 2 sprays by Each Nare route once daily.    glycerin adult suppository Place 1 suppository rectally as needed for Constipation.    lisinopril (PRINIVIL,ZESTRIL) 40 MG tablet Take 1 tablet (40 mg total) by mouth once daily.    magnesium 30 mg Tab Take 1 tablet by mouth once daily.     tramadol (ULTRAM) 50 mg tablet Take 1 tablet (50 mg total) by mouth 3 (three) times daily.    triamterene-hydrochlorothiazide 37.5-25 mg (MAXZIDE-25) 37.5-25 mg per tablet Take 1 tablet by mouth once daily.    TURMERIC (CURCUMIN MISC) Take 1 tablet by mouth once daily.     zolpidem (AMBIEN) 5 MG Tab Take 1 tablet (5 mg total) by mouth nightly as needed. (Patient taking differently: Take 5 mg by mouth nightly as needed (for sleep). )    [DISCONTINUED] montelukast (SINGULAIR) 10 mg tablet Take 1 tablet (10 mg total) by mouth after dinner.    [DISCONTINUED]  multivitamin capsule Take 4 capsules by mouth once daily.     Family History     Problem Relation (Age of Onset)    Cancer Mother, Father, Sister, Daughter        Social History Main Topics    Smoking status: Former Smoker     Years: 5.00    Smokeless tobacco: Not on file    Alcohol use 8.4 oz/week     14 Glasses of wine per week      Comment: 1 glass per day    Drug use: No    Sexual activity: Not Currently     Review of Systems   Constitutional: Positive for appetite change and fatigue. Negative for activity change, diaphoresis and fever.   HENT: Positive for congestion, rhinorrhea and sore throat.    Respiratory: Positive for cough and shortness of breath. Negative for chest tightness, wheezing and stridor.    Cardiovascular: Negative for chest pain, palpitations and leg swelling.   Gastrointestinal: Positive for abdominal distention and constipation. Negative for diarrhea, nausea and vomiting.   Genitourinary: Negative for dysuria.   Musculoskeletal: Positive for arthralgias and back pain.   Skin: Negative for color change.   Neurological: Positive for weakness. Negative for dizziness, speech difficulty and light-headedness.   Psychiatric/Behavioral: Positive for confusion.     Objective:     Vital Signs (Most Recent):  Temp: 98.3 °F (36.8 °C) (06/13/17 1212)  Pulse: 79 (06/13/17 1212)  Resp: 18 (06/13/17 1212)  BP: (!) 183/98 (06/13/17 1212)  SpO2: 98 % (06/13/17 1212) Vital Signs (24h Range):  Temp:  [97.6 °F (36.4 °C)-98.6 °F (37 °C)] 98.3 °F (36.8 °C)  Pulse:  [78-95] 79  Resp:  [18] 18  SpO2:  [96 %-100 %] 98 %  BP: (126-198)/(60-98) 183/98     Weight: 52.6 kg (116 lb)  Body mass index is 19.01 kg/m².    Physical Exam   Constitutional: She is oriented to person, place, and time. She appears well-developed and well-nourished. No distress.   HENT:   Head: Normocephalic and atraumatic.   Eyes: EOM are normal. Pupils are equal, round, and reactive to light.   Neck: Normal range of motion. No JVD present.    Cardiovascular: Normal rate, regular rhythm and normal heart sounds.    Pulmonary/Chest: Effort normal and breath sounds normal. No respiratory distress. She has no wheezes.   Abdominal: Soft. Bowel sounds are normal. She exhibits no distension. There is no tenderness.   Musculoskeletal:   Paralyzed from waist down; sensation intact.    Neurological: She is alert and oriented to person, place, and time.   Skin: Skin is warm and dry. Capillary refill takes less than 2 seconds. She is not diaphoretic.   Psychiatric: She has a normal mood and affect. Her behavior is normal. Thought content normal.        Significant Labs:      6/12/2017 14:44 6/13/2017 05:15   Sodium 125 (L) 134 (L)   Potassium 5.0 3.0 (L)   Chloride 90 (L) 104   CO2 24 23   Anion Gap 11 7 (L)   BUN, Bld 12 8   Creatinine 0.7 0.5   eGFR if non African American >60.0 >60.0   eGFR if African American >60.0 >60.0   Glucose 99 80   Calcium 9.7 7.5 (L)   Phosphorus 3.6 2.8   Magnesium 2.2 1.1 (L)       Significant Imaging:   Imaging Results          X-Ray Abdomen Flat And Erect (Final result)  Result time 06/12/17 16:44:14    Final result by Jorge Rizvi MD (06/12/17 16:44:14)                 Impression:     See above      Electronically signed by: Jorge Rizvi MD  Date:     06/12/17  Time:    16:44              Narrative:    2 views    No free air in the abdomen.  Slight degree of  diffuse bowel dilatation involving the colon and small bowel is identified.  Thoracolumbar scoliosis.                                Assessment/Plan:     * Hyponatremia    - Likely multifactorial, with large contribution of hypovolemia hyponatremia from decreased PO intake past month. Contribution of HCTZ component in combination HTN medication (Urine Sodium 54>40 indicating renal losses). TSH mildly elevated in recent labs last month but with normal free T4 - may have mild hypothyroidism contribution.   - Urine osmolality at low end of normal at 217, likely hypovolemia.  -  Given 1L IVF in ED and continued on NS infusion at 75 ml/hr.  - Daily BMP.          Chronic constipation    - Senna-docusate PO BID and simethicone QID.  - Consider PRN enema for symptomatic relief.  - Abdominal XR unremarkable.   - Consider nutrition consult.            ALS (amyotrophic lateral sclerosis)    - Not currently on medication.  - PT/OT evaluate and treat.           Essential hypertension    - Continue lisinopril 40 mg PO daily.  - Holding Maxzide PO daily considering contribution to patient's hyponatremia.          Chronic rhinitis    - Continue flonase daily and montelukast 10 mg PO nightly.           Hypothyroidism    - Continue home thyroid tablet 30 mg PO daily.           Poor appetite    - Consider nutrition consult and possible mirtazapine (on Ambien currently) to cover insomnia as well.        Thrush    - Continue Flaherty's solution QID.           VTE Risk Mitigation         Ordered     enoxaparin injection 40 mg  Daily     Route:  Subcutaneous        06/12/17 1929     Medium Risk of VTE  Once      06/12/17 1929        Mariposa Diggs MD  Department of Hospital Medicine   [facility]

## 2017-06-13 NOTE — MEDICAL/APP STUDENT
"Subjective:       Patient ID: Jaelyn Edmonds is a 81 y.o. female.    Chief Complaint: Shortness of Breath and Abdominal Pain    82 y/o  F w/ALS, essential HTN c/o bloating over the last 5 weeks with increasing SOB. Bloating sensation and chronic constipation are worse post-prandial. Also c/o loss of appetite (20 Ilbs over 5 weeks), decreased energy/activity levels, insomnia.  Notes chronic constipation.  Notes mental "fogginess" but denies confusion or seizure.  Denies N/V/D.  Notes increasing frequency of urination, denies dysuria.       Shortness of Breath   This is a chronic problem. The current episode started more than 1 month ago. The problem occurs constantly. The problem has been gradually worsening. Pertinent negatives include no abdominal pain (Denies actual abdominal pain), ear pain, headaches, sore throat, vomiting or wheezing. Nothing aggravates the symptoms. Risk factors include prolonged immobilization. She has tried nothing for the symptoms.   Abdominal Pain   Associated symptoms include constipation. Pertinent negatives include no diarrhea, dysuria, headaches, hematuria, nausea or vomiting.     Review of Systems   Constitutional: Positive for activity change, appetite change, fatigue and unexpected weight change.   HENT: Negative for ear pain, hearing loss and sore throat.    Eyes: Positive for visual disturbance (Blurring of vision with overhead light). Negative for photophobia, pain and discharge.   Respiratory: Positive for shortness of breath. Negative for cough and wheezing.    Gastrointestinal: Positive for constipation. Negative for abdominal distention (Denies feeling abdomen distention), abdominal pain (Denies actual abdominal pain), anal bleeding, blood in stool, diarrhea, nausea and vomiting.   Endocrine: Positive for polyuria. Negative for polydipsia.   Genitourinary: Negative for difficulty urinating, dysuria and hematuria.   Musculoskeletal: Positive for back pain (Believes " secondary to chronic scoliosis).   Neurological: Negative for dizziness and headaches.   Psychiatric/Behavioral: Negative for confusion. The patient is nervous/anxious.        Active Ambulatory Problems     Diagnosis Date Noted    Dysphonia 08/25/2016    Voice disturbance 08/25/2016    Chronic rhinitis 08/25/2016    Postnasal drip 08/25/2016    Throat clearing 08/25/2016    Breast cancer 01/01/2011    Essential hypertension 09/08/2016    ALS (amyotrophic lateral sclerosis) 09/08/2016    Impaired mobility and ADLs 01/18/2017    Dysarthria 03/15/2017     Resolved Ambulatory Problems     Diagnosis Date Noted    No Resolved Ambulatory Problems     Past Medical History:   Diagnosis Date    ALS (amyotrophic lateral sclerosis)     Breast cancer 2011    Hypertension     Hypothyroid      Past Surgical History:   Procedure Laterality Date    SPINE SURGERY  2015    L4-5 laminectomy     Social History     Social History    Marital status:      Spouse name: N/A    Number of children: N/A    Years of education: N/A     Occupational History    Not on file.     Social History Main Topics    Smoking status: Former Smoker     Years: 5.00    Smokeless tobacco: Not on file    Alcohol use 8.4 oz/week     14 Glasses of wine per week      Comment: 1 glass per day    Drug use: No    Sexual activity: Not Currently     Other Topics Concern    Not on file     Social History Narrative    , husb has chronic illnesses with CAD and arthritis. 4 kids. Previous bridal shop owner.   L[facility] w/ in [facility] in [place]    Scheduled Meds:   duke's soln (benadryl 30 mL, mylanta 30 mL, lidocane 30 mL, nystatin 30 mL) 120 mL  10 mL Oral QID    duloxetine  30 mg Oral Daily    enoxaparin  40 mg Subcutaneous Daily    fluticasone  2 spray Each Nare Daily    lisinopril  40 mg Oral Daily    magnesium oxide  400 mg Oral Once    magnesium sulfate IVPB  2 g Intravenous Q2H    montelukast  10 mg Oral After dinner     potassium bicarbonate  50 mEq Oral Q4H    senna-docusate 8.6-50 mg  1 tablet Oral BID    simethicone  2 tablet Oral QID (PC + HS)    sodium chloride 0.9%  3 mL Intravenous Q8H    thyroid  30 mg Oral Daily    zolpidem  5 mg Oral QHS     Continuous Infusions:   PRN Meds:.acetaminophen, alprazolam, ondansetron, tramadol                  Review of patient's allergies indicates:  No Known Allergies    Objective:     Vitals:    06/12/17 1823 06/12/17 2000 06/12/17 2300 06/13/17 0500   BP: (!) 176/82 (!) 198/88 126/60 (!) 162/67   BP Location:  Left arm Left arm Right arm   Patient Position:  Lying Lying Lying   BP Method:  Automatic Automatic Automatic   Pulse: 84 82 86 78   Resp: 18 18 18 18   Temp: 98 °F (36.7 °C) 98.6 °F (37 °C) 97.8 °F (36.6 °C) 97.6 °F (36.4 °C)   TempSrc:  Oral Oral Oral   SpO2: 98% 97% 96% 100%   Weight:       Height:       Body mass index is 19.01 kg/m².     Physical Exam   Constitutional: She is oriented to person, place, and time. No distress.   Appears gaunt   HENT:   Head: Atraumatic.   Nose: Nose normal.   Eyes: Conjunctivae and EOM are normal. Pupils are equal, round, and reactive to light. Right eye exhibits no discharge. Left eye exhibits no discharge. No scleral icterus.   Neck: Normal range of motion. No tracheal deviation present.   Cardiovascular: Normal rate, regular rhythm, normal heart sounds and intact distal pulses.    Pulmonary/Chest: Effort normal and breath sounds normal. No stridor. No respiratory distress. She has no wheezes. She has no rales.   Abdominal: Soft. Bowel sounds are normal. She exhibits no distension and no mass. There is tenderness (Suprapubic TTP). There is no guarding.   Musculoskeletal:   Paralyzed below waist, intact sensation, no swelling of LEs   Neurological: She is alert and oriented to person, place, and time.   Skin: Skin is warm and dry. Capillary refill takes less than 2 seconds. No erythema.   Psychiatric: She has a normal mood and affect. Her  behavior is normal. Judgment and thought content normal.     Imaging Results          X-Ray Abdomen Flat And Erect (Final result)  Result time 06/12/17 16:44:14    Final result by Jorge Rizvi MD (06/12/17 16:44:14)                 Impression:     See above      Electronically signed by: Jorge Rizvi MD  Date:     06/12/17  Time:    16:44              Narrative:    2 views    No free air in the abdomen.  Slight degree of  diffuse bowel dilatation involving the colon and small bowel is identified.  Thoracolumbar scoliosis.                          Significant Labs:   BMP:   Recent Labs  Lab 06/12/17  1444   GLU 99   *   K 5.0   CL 90*   CO2 24   BUN 12   CREATININE 0.7   CALCIUM 9.7   MG 2.2      CBC:   Recent Labs  Lab 06/12/17  1444   WBC 7.62   HGB 16.6*   HCT 47.2          Assessment:       1. Hyponatremia    2. Constipation        Plan:         Hyponatremia:  Multifactorial:  Hypovalemia (decreased PO intake), meds (HTCZ/HTN meds), possible hypothyroid component (TSH mildly elevated last month, normal T4).    Urine osmolality 217, low normal    Given 1L IVF in ER, continued on 75mL/hr NS    BMP daily    Chronic constipation:    Senna-docusate PO BID    Simethicone QID    PRN enema Sx relief    Normal Abdominal XR    Consult nutrition?    ALS:   Not currently on meds   PT/OT consult    Essential HTN:   continue home lisinopril 40mg PO daily   hold Maxzide POD for possible hyponatremia contribution

## 2017-06-13 NOTE — NURSING
Pt educated on medication changes and purpose.  Released to transportation provided by assisted living facility.

## 2017-06-13 NOTE — PLAN OF CARE
[facility]    HOME HEALTH ORDERS  FACE TO FACE ENCOUNTER    Patient Name: Jaelyn Edmonds  YOB: 1935    PCP: Ismael Sanchez MD   [facility] [address]  PCP Phone Number: [phone]  PCP Fax: [phone]    Encounter Date: 06/13/2017    Admit to Home Health    Diagnoses:  Active Hospital Problems    Diagnosis  POA    *Hyponatremia [E87.1]  Yes     Priority: 1 - High    Chronic constipation [K59.09]  Yes     Priority: 2     ALS (amyotrophic lateral sclerosis) [G12.21]  Yes     Priority: 3     Essential hypertension [I10]  Yes     Priority: 4     Chronic rhinitis [J31.0]  Yes     Priority: 5     Hypothyroidism [E03.9]  Yes     Priority: 6     Thrush [B37.0]  Yes    Poor appetite [R63.0]  Yes      Resolved Hospital Problems    Diagnosis Date Resolved POA   No resolved problems to display.       Future Appointments  Date Time Provider Department Center   6/21/2017 8:00 AM ALS, CLINIC [facility]     Follow-up Information     [facility] - Gastroenterology. Schedule an appointment as soon as possible for a visit in 1 week.    Specialty:  Gastroenterology  Contact information:  [address] [place], [place] [address] [address] [phone]  Additional information:  [facility] - [address] Floor           Ismael Sanchez MD. Schedule an appointment as soon as possible for a visit in 2 weeks.    Specialty:  Internal Medicine  Contact information:  [address] [facility] [address]  [phone]                     I have seen and examined this patient face to face today. My clinical findings that support the need for the home health skilled services and home bound status are the following:  Weakness/numbness causing balance and gait disturbance due to Weakness/Debility and ALS making it taxing to leave home.    Allergies:Review of patient's allergies indicates:  No Known Allergies    Diet: regular diet    Activities: activity as  tolerated    Nursing:   SN to complete comprehensive assessment including routine vital signs. Instruct on disease process and s/s of complications to report to MD. Review/verify medication list sent home with the patient at time of discharge  and instruct patient/caregiver as needed. Frequency may be adjusted depending on start of care date.    Notify MD if SBP > 160 or < 90; DBP > 90 or < 50; HR > 120 or < 50; Temp > 101      CONSULTS:    Physical Therapy to evaluate and treat. Evaluate for home safety and equipment needs; Establish/upgrade home exercise program. Perform / instruct on therapeutic exercises, gait training, transfer training, and Range of Motion.  Occupational Therapy to evaluate and treat. Evaluate home environment for safety and equipment needs. Perform/Instruct on transfers, ADL training, ROM, and therapeutic exercises.  Speech Therapy  to evaluate and treat for  Swallowing.  Aide to provide assistance with personal care, ADLs, and vital signs.    MISCELLANEOUS CARE:  N/A    WOUND CARE ORDERS  n/a      Medications: Review discharge medications with patient and family and provide education.      Current Discharge Medication List      START taking these medications    Details   amlodipine (NORVASC) 10 MG tablet Take 1 tablet (10 mg total) by mouth once daily.  Qty: 60 tablet, Refills: 6      lactulose (CHRONULAC) 20 gram/30 mL Soln Take 30 mLs (20 g total) by mouth every 6 (six) hours as needed.  Qty: 900 mL, Refills: 0      senna-docusate 8.6-50 mg (PERICOLACE) 8.6-50 mg per tablet Take 1 tablet by mouth 2 (two) times daily.         CONTINUE these medications which have NOT CHANGED    Details   alprazolam (XANAX) 0.25 MG tablet Take 1 tablet (0.25 mg total) by mouth 3 (three) times daily.  Qty: 90 tablet, Refills: 3    Associated Diagnoses: Anxiety      ARMOUR THYROID 30 mg Tab Take 30 mg by mouth once daily.  Refills: 0      cetirizine (ZYRTEC) 10 MG tablet Take 1 tablet (10 mg total) by mouth  once daily.  Qty: 90 tablet, Refills: 11    Associated Diagnoses: Nasal congestion      cyanocobalamin, vitamin B-12, 1,000 mcg/mL Kit Inject 1,000 mcg as directed 3 (three) times a week.  Qty: 12 kit, Refills: 5    Associated Diagnoses: ALS (amyotrophic lateral sclerosis); Bilateral leg pain; Vitamin D deficiency      fluticasone (FLONASE) 50 mcg/actuation nasal spray 2 sprays by Each Nare route once daily.  Qty: 16 g, Refills: 1    Associated Diagnoses: Post-nasal drip      glycerin adult suppository Place 1 suppository rectally as needed for Constipation.  Qty: 10 suppository, Refills: 6    Associated Diagnoses: Constipation, unspecified constipation type; ALS (amyotrophic lateral sclerosis)      ibuprofen (ADVIL,MOTRIN) 800 MG tablet Take 800 mg by mouth 3 (three) times daily as needed for Pain.      lisinopril (PRINIVIL,ZESTRIL) 40 MG tablet Take 1 tablet (40 mg total) by mouth once daily.  Qty: 90 tablet, Refills: 11    Associated Diagnoses: Essential hypertension      magnesium 30 mg Tab Take 1 tablet by mouth once daily.       multivitamin with iron-mineral Liqd Take 15 mLs by mouth once daily.      NYSTATIN (DUKE'S SOLUTION) Take 10 mLs by mouth 4 (four) times daily.      tramadol (ULTRAM) 50 mg tablet Take 1 tablet (50 mg total) by mouth 3 (three) times daily.  Qty: 90 tablet, Refills: 1    Associated Diagnoses: Bilateral leg pain; Chronic midline low back pain without sciatica      TURMERIC (CURCUMIN MISC) Take 1 tablet by mouth once daily.       zolpidem (AMBIEN) 5 MG Tab Take 1 tablet (5 mg total) by mouth nightly as needed.  Qty: 30 tablet, Refills: 1      duloxetine (CYMBALTA) 30 MG capsule Take 1 capsule (30 mg total) by mouth once daily.  Qty: 30 capsule, Refills: 11    Associated Diagnoses: Reactive depression         STOP taking these medications       montelukast (SINGULAIR) 10 mg tablet Comments:   Reason for Stopping:         triamterene-hydrochlorothiazide 37.5-25 mg (MAXZIDE-25) 37.5-25 mg  per tablet Comments:   Reason for Stopping:               I certify that this patient is confined to her home and needs intermittent skilled nursing care, physical therapy, speech therapy and occupational therapy.    Osiel Diggs MD  PGY-1 Internal Medicine  [phone]

## 2017-06-13 NOTE — PHARMACY MED REC
[facility] Medication Reconciliation  Template    Patient was admitted on 6/12/2017 for Hyponatremia.    Patient's prior to admission medication regimen was as follows:  Prescriptions Prior to Admission   Medication Sig Dispense Refill Last Dose    alprazolam (XANAX) 0.25 MG tablet Take 1 tablet (0.25 mg total) by mouth 3 (three) times daily. 90 tablet 3 6/11/2017    ARMOUR THYROID 30 mg Tab Take 30 mg by mouth once daily.  0 6/12/2017    cetirizine (ZYRTEC) 10 MG tablet Take 1 tablet (10 mg total) by mouth once daily. 90 tablet 11 6/12/2017    cyanocobalamin, vitamin B-12, 1,000 mcg/mL Kit Inject 1,000 mcg as directed 3 (three) times a week. (Patient taking differently: Inject 1,000 mcg as directed every Mon, Wed, Fri. ) 12 kit 5 6/12/2017    fluticasone (FLONASE) 50 mcg/actuation nasal spray 2 sprays by Each Nare route once daily. 16 g 1 6/12/2017    glycerin adult suppository Place 1 suppository rectally as needed for Constipation. 10 suppository 6     ibuprofen (ADVIL,MOTRIN) 800 MG tablet Take 800 mg by mouth 3 (three) times daily as needed for Pain.       lisinopril (PRINIVIL,ZESTRIL) 40 MG tablet Take 1 tablet (40 mg total) by mouth once daily. 90 tablet 11 6/12/2017    magnesium 30 mg Tab Take 1 tablet by mouth once daily.    6/12/2017    multivitamin with iron-mineral Liqd Take 15 mLs by mouth once daily.       NYSTATIN (DUKE'S SOLUTION) Take 10 mLs by mouth 4 (four) times daily.   6/12/2017    tramadol (ULTRAM) 50 mg tablet Take 1 tablet (50 mg total) by mouth 3 (three) times daily. 90 tablet 1 6/11/2017    TURMERIC (CURCUMIN MISC) Take 1 tablet by mouth once daily.    6/12/2017    zolpidem (AMBIEN) 5 MG Tab Take 1 tablet (5 mg total) by mouth nightly as needed. (Patient taking differently: Take 5 mg by mouth nightly as needed (for sleep). ) 30 tablet 1 6/11/2017    [DISCONTINUED] triamterene-hydrochlorothiazide 37.5-25 mg (MAXZIDE-25) 37.5-25 mg per tablet Take 1 tablet by mouth once  "daily. 90 tablet 11 6/12/2017    duloxetine (CYMBALTA) 30 MG capsule Take 1 capsule (30 mg total) by mouth once daily. 30 capsule 11      Please add appropriate    SmartPhrase below:  Admission Medication Reconciliation - Pharmacy Consult Note    The home medication history was taken by Amber Lund, pharmacy technician.  Based on information gathered and subsequent review by the clinical pharmacist, the items below may need attention.     You may go to "Admission" then "Reconcile Home Medications" tabs to review and/or act upon these items. Based on information gathered and subsequent review by the clinical pharmacist, the items below may need attention.    Potentially problematic discrepancies with current MAR  o Patient IS taking the following which was not ordered upon admit  o Cetirizine 10 mg QD  o Vitamin B12 1000 mcg SQ Q M/W/F  o Glycerin suppository QD PRN   o Magnesium 30 mg QD  o Multivitamin 15 ml QD  o Nystatin oral solution 10 ml PO QID  o Triamterene/HCTZ 37.5/25 mg QD (held)  o Patient IS NOT taking the following which was ordered upon admit  o Singulair 10 mg QD   o Patient is taking a drug DIFFERENTLY than how ordered upon admit  o Zolpidem 5 mg QHS PRN sleep (ordered as Zolpidem 5 mg QHS)  o Tramadol 50 mg TID (ordered as Tramadol 50 mg Q 6 hr PRN)    Please address this information as you see fit.  Feel free to contact us if you have any questions or require assistance.    Ofe Vergara  EXT [phone]        "

## 2017-06-13 NOTE — PLAN OF CARE
Problem: Patient Care Overview  Goal: Plan of Care Review  Outcome: Ongoing (interventions implemented as appropriate)  Pt. Admitted with abdominal bloating, abd.. Soft. Frequent urination. Tried a pure wick system did not work well with her. Patient has bilateral foot drop, bilateral heel boots applied.

## 2017-06-13 NOTE — PROGRESS NOTES
[facility] Hospital Medicine  Progress Note    Patient Name: Jaelyn Edmonds  MRN: [id number]  Patient Class: IP- Inpatient   Admission Date: 6/12/2017  Length of Stay: 0 days  Attending Physician: Ezekiel Bangura MD  Primary Care Provider: Ismael Sanchez MD    [facility] Medicine Team: [facility] HOSP MED 3 Osiel Diggs MD    Subjective:     Principal Problem:Hyponatremia    HPI:  Jaelyn Edmonds is a 81 year old female with a medical history significant for ALD, essential hypertension, chronic rhinitis who presents to the ED on 6/12 with symptoms of abdominal bloating and post-prandial abdominal discomfort related to her constipation that has gotten progressively worse the past 4-5 weeks along with increasingly worsening poor appetite. During this time, patient has felt more fatigued and has had some mental fogginess, but denies any seizure symptoms. Of note, patient does take triamterene-HCTZ combination for blood pressure along with lisinopril. Upon check of basic labs in the ED here, patient found to have hyponatremia with sodium of 125 (note last sodium checked was 124 on 5/26). Patient states she takes Senakot 2-3 times daily with only mild relief of her symptoms, which she states is worse prior and right after she eats. She states that there is moderate relief when she has had a bowel movement but it is only finite. She otherwise denies nausea or vomiting. Patient states the past several weeks the abdominal pain is so uncomfortable that she finds herself working to breath especially when she sits up as she feels the fullness in her abdomen.     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 Patient still has lower extremity sensation. She resides in [facility] with her . Exercises sporadically with a trapeze.     Hospital Course:  No notes on file    Past Medical History:   Diagnosis Date     ALS (amyotrophic lateral sclerosis)     Breast cancer 2011    Breast cancer - lumpectomy, radiation, no chemo; L    Hypertension     Hypothyroid        Past Surgical History:   Procedure Laterality Date    SPINE SURGERY  2015    L4-5 laminectomy       Review of patient's allergies indicates:  No Known Allergies    No current facility-administered medications on file prior to encounter.      Current Outpatient Prescriptions on File Prior to Encounter   Medication Sig    alprazolam (XANAX) 0.25 MG tablet Take 1 tablet (0.25 mg total) by mouth 3 (three) times daily.    ARMOUR THYROID 30 mg Tab Take 30 mg by mouth once daily.    cetirizine (ZYRTEC) 10 MG tablet Take 1 tablet (10 mg total) by mouth once daily.    cyanocobalamin, vitamin B-12, 1,000 mcg/mL Kit Inject 1,000 mcg as directed 3 (three) times a week.    fluticasone (FLONASE) 50 mcg/actuation nasal spray 2 sprays by Each Nare route once daily.    lisinopril (PRINIVIL,ZESTRIL) 40 MG tablet Take 1 tablet (40 mg total) by mouth once daily.    magnesium 30 mg Tab Take by mouth.    montelukast (SINGULAIR) 10 mg tablet Take 1 tablet (10 mg total) by mouth after dinner.    multivitamin capsule Take 4 capsules by mouth once daily.    tramadol (ULTRAM) 50 mg tablet Take 1 tablet (50 mg total) by mouth 3 (three) times daily.    triamterene-hydrochlorothiazide 37.5-25 mg (MAXZIDE-25) 37.5-25 mg per tablet Take 1 tablet by mouth once daily.    TURMERIC (CURCUMIN MISC) by Misc.(Non-Drug; Combo Route) route once daily.    zolpidem (AMBIEN) 5 MG Tab Take 1 tablet (5 mg total) by mouth nightly as needed.    glycerin adult suppository Place 1 suppository rectally as needed for Constipation.     Family History     Problem Relation (Age of Onset)    Cancer Mother, Father, Sister, Daughter        Social History Main Topics    Smoking status: Former Smoker     Years: 5.00    Smokeless tobacco: Not on file    Alcohol use 8.4 oz/week     14 Glasses of wine  per week      Comment: 1 glass per day    Drug use: No    Sexual activity: Not Currently     Review of Systems   Constitutional: Positive for appetite change and fatigue. Negative for activity change, diaphoresis and fever.   HENT: Positive for congestion, rhinorrhea and sore throat.    Respiratory: Positive for cough and shortness of breath. Negative for chest tightness, wheezing and stridor.    Cardiovascular: Negative for chest pain, palpitations and leg swelling.   Gastrointestinal: Positive for abdominal distention and constipation. Negative for diarrhea, nausea and vomiting.   Genitourinary: Negative for dysuria.   Musculoskeletal: Positive for arthralgias and back pain.   Skin: Negative for color change.   Neurological: Positive for weakness. Negative for dizziness, speech difficulty and light-headedness.   Psychiatric/Behavioral: Positive for confusion.     Objective:     Vital Signs (Most Recent):  Temp: 98 °F (36.7 °C) (06/12/17 1823)  Pulse: 84 (06/12/17 1823)  Resp: 18 (06/12/17 1823)  BP: (!) 176/82 (06/12/17 1823)  SpO2: 98 % (06/12/17 1823) Vital Signs (24h Range):  Temp:  [97.8 °F (36.6 °C)-98 °F (36.7 °C)] 98 °F (36.7 °C)  Pulse:  [84-95] 84  Resp:  [18] 18  SpO2:  [96 %-98 %] 98 %  BP: (135-176)/(82) 176/82     Weight: 52.6 kg (116 lb)  Body mass index is 19.01 kg/m².    Physical Exam   Constitutional: She is oriented to person, place, and time. She appears well-developed and well-nourished. No distress.   HENT:   Head: Normocephalic and atraumatic.   Eyes: EOM are normal. Pupils are equal, round, and reactive to light.   Neck: Normal range of motion. No JVD present.   Cardiovascular: Normal rate, regular rhythm and normal heart sounds.    Pulmonary/Chest: Effort normal and breath sounds normal. No respiratory distress. She has no wheezes.   Abdominal: Soft. Bowel sounds are normal. She exhibits no distension. There is no tenderness.   Musculoskeletal:   Paralyzed from waist down; sensation  intact.    Neurological: She is alert and oriented to person, place, and time.   Skin: Skin is warm and dry. Capillary refill takes less than 2 seconds. She is not diaphoretic.   Psychiatric: She has a normal mood and affect. Her behavior is normal. Thought content normal.        Significant Labs:   BMP:   Recent Labs  Lab 06/12/17  1444   GLU 99   *   K 5.0   CL 90*   CO2 24   BUN 12   CREATININE 0.7   CALCIUM 9.7   MG 2.2     CBC:   Recent Labs  Lab 06/12/17  1444   WBC 7.62   HGB 16.6*   HCT 47.2          Significant Imaging:   Imaging Results          X-Ray Abdomen Flat And Erect (Final result)  Result time 06/12/17 16:44:14    Final result by Jorge Rizvi MD (06/12/17 16:44:14)                 Impression:     See above      Electronically signed by: Jorge Rizvi MD  Date:     06/12/17  Time:    16:44              Narrative:    2 views    No free air in the abdomen.  Slight degree of  diffuse bowel dilatation involving the colon and small bowel is identified.  Thoracolumbar scoliosis.                                Assessment/Plan:      * Hyponatremia    - Likely multifactorial, with large contribution of hypovolemia hyponatremia from decreased PO intake past month. Contribution of HCTZ component in combination HTN medication (Urine Sodium 54>40 indicating renal losses). TSH mildly elevated in recent labs last month but with normal free T4 - may have mild hypothyroidism contribution.   - Urine osmolality at low end of normal at 217, likely hypovolemia.  - Given 1L IVF in ED and continued on NS infusion at 75 ml/hr.  - Daily BMP.          Chronic constipation    - Senna-docusate PO BID and simethicone QID.  - Consider PRN enema for symptomatic relief.  - Abdominal XR unremarkable.   - Consider nutrition consult.            ALS (amyotrophic lateral sclerosis)    - Not currently on medication.  - PT/OT evaluate and treat.           Essential hypertension    - Continue lisinopril 40 mg PO daily.  -  Holding Maxzide PO daily considering contribution to patient's hyponatremia.          Chronic rhinitis    - Continue flonase daily and montelukast 10 mg PO nightly.           Hypothyroidism    - Continue home thyroid tablet 30 mg PO daily.           Poor appetite    - Consider nutrition consult and possible mirtazapine (on Ambien currently) to cover insomnia as well.        Thrush    - Continue Flaherty's solution QID.           VTE Risk Mitigation         Ordered     enoxaparin injection 40 mg  Daily     Route:  Subcutaneous        06/12/17 1929     Medium Risk of VTE  Once      06/12/17 1929          Osiel Diggs MD  PGY-1 Internal Medicine  [phone]    Department of Hospital Medicine   [facility]

## 2017-06-13 NOTE — DISCHARGE INSTRUCTIONS
You were admitted to the hospital due to low sodium in your blood, which can prone your to weakness, fatigue, confusion, and seizures if it becomes too low. We have very high suspicion this was secondary to two reasons, your low appetite as well as one of your blood pressure medications - the Maxzide, which acts as a diuretic and can cause electrolyte disturbances.    Your sodium today has corrected to normal as result of stopping that medication and giving you IV fluid for replenishment. For your blood pressure, we recommend starting on another blood pressure medication called AMLODIPINE. Please take 10 mg by mouth daily. The main side effect is lower extremity edema in your feet, which occurs approximately 10% of the time. STOP the Maxzide.    Otherwise, for your abdominal discomfort, bloating, and constipation. We would recommend over-the-counter Kathy-Colace, which acts as both a laxative + a stool softener. Take one tablet TWICE a day. You can continue your Senakot as well.    We have also prescribed your a good laxative for your constipation called lactulose that you can take up to four times per day, as needed. Take two Tablespoons (30 ml) at a time for relief of constipation.    We have placed a consult to gastroenterology for your abdominal symptoms. If they have not called you in the next several days, please call the number provided to set up an appointment. Otherwise, we recommend a follow-up appointment with your primary care provider within the next two days. We believe that a medication called mirtazapine can be be beneficial for you as it stimulates appetite as well as helps with sleep. Please address this with your PCP when you see him as well as discussion about blood pressure control.

## 2017-06-13 NOTE — PLAN OF CARE
Problem: Physical Therapy Goal  Goal: Physical Therapy Goal  Goals to be met by: 17     Patient will increase functional independence with mobility by performin. Supine to sit with Moderate Assistance  2. Sit to supine with Moderate Assistance  3. Sit to stand transfer with Maximum Assistance  4. Bed to chair transfer with Maximum Assistance using LRAD  5. Wheelchair propulsion x 20 feet with Moderate Assistance using bilateral uppper extremities  6. Sitting at edge of bed x 5 minutes with Contact Guard Assistance    Outcome: Ongoing (interventions implemented as appropriate)  Evaluation complete and goals established. Upon initial PT evaluation, pt presents with decreased functional mobility, weakness, imbalance, decreased BLE function, decreased BUE function, impaired self care skills, and decreased endurance. Pt completed bed mobility with max A. Pt sat EOB x 15 minutes with varying assistance from max A to SBA. Pt would benefit from skilled PT services to address these deficits and improve return to PLOF. Anticipate d/c to HHPT.     Yadi Carias DPT, PT  2017

## 2017-06-13 NOTE — PLAN OF CARE
06/13/17 1428   Discharge Assessment   Assessment Type Discharge Planning Assessment   Confirmed/corrected address and phone number on facesheet? Yes  (face sheet address is daughter in law's address for billing; patient and  live at [facility]  on [address] [facility])   Assessment information obtained from? Patient;Medical Record   Expected Length of Stay (days) 1   Communicated expected length of stay with patient/caregiver yes   Prior to hospitilization cognitive status: Alert/Oriented   Prior to hospitalization functional status: Partially Dependent   Current cognitive status: Alert/Oriented   Current Functional Status: Partially Dependent   Arrived From home or self-care   Lives With spouse   Able to Return to Prior Arrangements yes   Is patient able to care for self after discharge? No   How many people do you have in your home that can help with your care after discharge? (facility resident)   Who are your caregiver(s) and their phone number(s)? Jessika RN at [facility] ([phone])   Patient's perception of discharge disposition home health   Readmission Within The Last 30 Days no previous admission in last 30 days   Patient currently being followed by outpatient case management? No   Patient currently receives home health services? Yes   Patient previously received home health services and would like to resume services if necessary? Yes   If yes, name of home health provider: Romaine HOLBROOK   Does the patient currently use HME? Yes   Patient currently receives any other outside agency services? No   Equipment Currently Used at Home bath bench;wheelchair;power chair   Do you have any problems affording any of your prescribed medications? No   Is the patient taking medications as prescribed? yes   Do you have any financial concerns preventing you from receiving the healthcare you need? No   Does the patient have transportation to healthcare appointments? Yes   Transportation Available other (see  comments)  ([facility] provides transportation)   On Dialysis? No   Does the patient receive services at the [facility]? No   Are there any open cases? No   Discharge Plan A Assisted Living;Home Health   Patient/Family In Agreement With Plan yes

## 2017-06-13 NOTE — SUBJECTIVE & OBJECTIVE
Past Medical History:   Diagnosis Date    ALS (amyotrophic lateral sclerosis)     Breast cancer 2011    Breast cancer - lumpectomy, radiation, no chemo; L    Hypertension     Hypothyroid        Past Surgical History:   Procedure Laterality Date    SPINE SURGERY  2015    L4-5 laminectomy       Review of patient's allergies indicates:  No Known Allergies    No current facility-administered medications on file prior to encounter.      Current Outpatient Prescriptions on File Prior to Encounter   Medication Sig    alprazolam (XANAX) 0.25 MG tablet Take 1 tablet (0.25 mg total) by mouth 3 (three) times daily.    ARMOUR THYROID 30 mg Tab Take 30 mg by mouth once daily.    cetirizine (ZYRTEC) 10 MG tablet Take 1 tablet (10 mg total) by mouth once daily.    cyanocobalamin, vitamin B-12, 1,000 mcg/mL Kit Inject 1,000 mcg as directed 3 (three) times a week.    fluticasone (FLONASE) 50 mcg/actuation nasal spray 2 sprays by Each Nare route once daily.    lisinopril (PRINIVIL,ZESTRIL) 40 MG tablet Take 1 tablet (40 mg total) by mouth once daily.    magnesium 30 mg Tab Take by mouth.    montelukast (SINGULAIR) 10 mg tablet Take 1 tablet (10 mg total) by mouth after dinner.    multivitamin capsule Take 4 capsules by mouth once daily.    tramadol (ULTRAM) 50 mg tablet Take 1 tablet (50 mg total) by mouth 3 (three) times daily.    triamterene-hydrochlorothiazide 37.5-25 mg (MAXZIDE-25) 37.5-25 mg per tablet Take 1 tablet by mouth once daily.    TURMERIC (CURCUMIN MISC) by Misc.(Non-Drug; Combo Route) route once daily.    zolpidem (AMBIEN) 5 MG Tab Take 1 tablet (5 mg total) by mouth nightly as needed.    glycerin adult suppository Place 1 suppository rectally as needed for Constipation.     Family History     Problem Relation (Age of Onset)    Cancer Mother, Father, Sister, Daughter        Social History Main Topics    Smoking status: Former Smoker     Years: 5.00    Smokeless tobacco: Not on file    Alcohol  use 8.4 oz/week     14 Glasses of wine per week      Comment: 1 glass per day    Drug use: No    Sexual activity: Not Currently     Review of Systems   Constitutional: Positive for appetite change and fatigue. Negative for activity change, diaphoresis and fever.   HENT: Positive for congestion, rhinorrhea and sore throat.    Respiratory: Positive for cough and shortness of breath. Negative for chest tightness, wheezing and stridor.    Cardiovascular: Negative for chest pain, palpitations and leg swelling.   Gastrointestinal: Positive for abdominal distention and constipation. Negative for diarrhea, nausea and vomiting.   Genitourinary: Negative for dysuria.   Musculoskeletal: Positive for arthralgias and back pain.   Skin: Negative for color change.   Neurological: Positive for weakness. Negative for dizziness, speech difficulty and light-headedness.   Psychiatric/Behavioral: Positive for confusion.     Objective:     Vital Signs (Most Recent):  Temp: 98 °F (36.7 °C) (06/12/17 1823)  Pulse: 84 (06/12/17 1823)  Resp: 18 (06/12/17 1823)  BP: (!) 176/82 (06/12/17 1823)  SpO2: 98 % (06/12/17 1823) Vital Signs (24h Range):  Temp:  [97.8 °F (36.6 °C)-98 °F (36.7 °C)] 98 °F (36.7 °C)  Pulse:  [84-95] 84  Resp:  [18] 18  SpO2:  [96 %-98 %] 98 %  BP: (135-176)/(82) 176/82     Weight: 52.6 kg (116 lb)  Body mass index is 19.01 kg/m².    Physical Exam   Constitutional: She is oriented to person, place, and time. She appears well-developed and well-nourished. No distress.   HENT:   Head: Normocephalic and atraumatic.   Eyes: EOM are normal. Pupils are equal, round, and reactive to light.   Neck: Normal range of motion. No JVD present.   Cardiovascular: Normal rate, regular rhythm and normal heart sounds.    Pulmonary/Chest: Effort normal and breath sounds normal. No respiratory distress. She has no wheezes.   Abdominal: Soft. Bowel sounds are normal. She exhibits no distension. There is no tenderness.   Musculoskeletal:    Paralyzed from waist down; sensation intact.    Neurological: She is alert and oriented to person, place, and time.   Skin: Skin is warm and dry. Capillary refill takes less than 2 seconds. She is not diaphoretic.   Psychiatric: She has a normal mood and affect. Her behavior is normal. Thought content normal.        Significant Labs:   BMP:   Recent Labs  Lab 06/12/17  1444   GLU 99   *   K 5.0   CL 90*   CO2 24   BUN 12   CREATININE 0.7   CALCIUM 9.7   MG 2.2     CBC:   Recent Labs  Lab 06/12/17  1444   WBC 7.62   HGB 16.6*   HCT 47.2          Significant Imaging:   Imaging Results          X-Ray Abdomen Flat And Erect (Final result)  Result time 06/12/17 16:44:14    Final result by Jorge Rizvi MD (06/12/17 16:44:14)                 Impression:     See above      Electronically signed by: Jorge Rizvi MD  Date:     06/12/17  Time:    16:44              Narrative:    2 views    No free air in the abdomen.  Slight degree of  diffuse bowel dilatation involving the colon and small bowel is identified.  Thoracolumbar scoliosis.

## 2017-06-13 NOTE — PT/OT/SLP EVAL
"Physical Therapy  Evaluation    Jaelyn Edmonds   MRN: [id number]   Admitting Diagnosis: Hyponatremia    PT Received On: 06/13/17  PT Start Time: 0906     PT Stop Time: 0942    PT Total Time (min): 36 min       Billable Minutes:  Evaluation 20 minutes and Therapeutic Activity 16 minutes    Diagnosis: Hyponatremia  ALS- weakness, impaired endurance    Past Medical History:   Diagnosis Date    ALS (amyotrophic lateral sclerosis)     Breast cancer 2011    Breast cancer - lumpectomy, radiation, no chemo; L    Hypertension     Hypothyroid       Past Surgical History:   Procedure Laterality Date    SPINE SURGERY  2015    L4-5 laminectomy       Referring physician: Osiel Diggs MD  Date referred to PT: 6/12/17    General Precautions: Standard, fall  Orthopedic Precautions: N/A   Braces: N/A       Do you have any cultural, spiritual, Rastafarian conflicts, given your current situation?: None stated     Patient History:  Lives With: spouse  Living Arrangements: independent living facility  Home Layout: Able to live on 1st floor  Living Environment Comment: Pt reports living with  in [facility] with 24/7 assistance from aides. Bathroom has tub/shower combo with TTB. PTA, pt required assistance for bed mobility and transfers as well as bathing and LB dressing. Pt reports unable to ambulate in ~4 months and requried assistance for wheelchair propulsion. Pt reports receiving HHPT and HH speech therapy.   Equipment Currently Used at Home: walker, rolling, wheelchair, bath bench    Previous Level of Function:  Ambulation Skills: unable to perform  Transfer Skills: needs assist  ADL Skills: needs assist  Work/Leisure Activity: needs assist    Subjective:  Communicated with RN prior to session.  Pt agreeable to PT/OT evaluation. Pt states "I need to use the bed pan"   Chief Complaint: BLE weakness, impaired fine motor, pain   Patient goals: none stated     Pain/Comfort  Pain Rating 1:  (Unable to provide " numerical value )  Location - Side 1: Bilateral  Location - Orientation 1: lower  Location 1: leg (while stretching hip flexors while donning socks)  Pain Addressed 1: Reposition, Distraction, Cessation of Activity  Pain Rating Post-Intervention 1:  (See comment above )      Objective:   Patient found with: telemetry, pressure relief boots     Cognitive Exam:  Oriented to: Person, Place, Time and Situation    Follows Commands/attention: Follows one-step commands  Communication: clear/fluent  Safety awareness/insight to disability: impaired    Physical Exam:  Postural examination/scapula alignment: Rounded shoulder and Head forward    Skin integrity: Thin and Dry  Edema: None noted in BLE    Sensation:   Intact  light/touch 3/3 in BLE    Lower Extremity Range of Motion: deficits in all planes via AAROM    Lower Extremity Strength:  Right Lower Extremity: Deficits: hip flexion 1/5, knee extension/flexion 1/5, ankle DF 0/5  Left Lower Extremity: Deficits: hip flexion 1/5, knee extension/flexion 1/5, ankle DF 0/5     Functional Mobility:  Bed Mobility:  Rolling/Turning to Left: Maximum assistance, With side rail (x 2 trials)  Rolling/Turning Right: Maximum assistance, With side rail (x 2 trials)  Scooting/Bridging: Total Assistance (x 4 trials of side scooting towards HOB)  Supine to Sit: Maximum Assistance (max A at BLE and mod A at trunk.)  Sit to Supine: Maximum Assistance    Transfers:  Sit <> Stand Assistance: Activity did not occur    Gait:   Gait Distance: Unable     Balance:   Static Sit: POOR+: Needs MINIMAL assist to maintain  Dynamic Sit: 0: N/A    Therapeutic Activities and Exercises:  Therapeutic activities aimed to increase pt's independence, safety, and efficiency with bed mobility and functional transfers. See above for assistance levels.   · Pt sat EOB x 15 minutes with varying assistance from Max A to SBA- pending task. Pt required SBA while using LLE on side rail for support. During dynamic tasks  increase assistance required as much as Max A secondary to posterior. Pt completed self care during this time. Verbal cues for core activation and postural awareness during sitting balance  · 4 side scoots towards HOB with total A.  · 2 trials of rolling in B directions with max A and side of side rail in order to perform perineal hygiene  · Pt educated on role of PT and POC/goals for therapy as well as safety with mobility. Pt verbalized understanding. Pt expressed no further concerns/questions.     AM-PAC 6 CLICK MOBILITY  How much help from another person does this patient currently need?   1 = Unable, Total/Dependent Assistance  2 = A lot, Maximum/Moderate Assistance  3 = A little, Minimum/Contact Guard/Supervision  4 = None, Modified [place]/Independent    Turning over in bed (including adjusting bedclothes, sheets and blankets)?: 2  Sitting down on and standing up from a chair with arms (e.g., wheelchair, bedside commode, etc.): 1  Moving from lying on back to sitting on the side of the bed?: 2  Moving to and from a bed to a chair (including a wheelchair)?: 1  Need to walk in hospital room?: 1  Climbing 3-5 steps with a railing?: 1  Total Score: 8     AM-PAC Raw Score CMS G-Code Modifier Level of Impairment Assistance   6 % Total / Unable   7 - 9 CM 80 - 100% Maximal Assist   10 - 14 CL 60 - 80% Moderate Assist   15 - 19 CK 40 - 60% Moderate Assist   20 - 22 CJ 20 - 40% Minimal Assist   23 CI 1-20% SBA / CGA   24 CH 0% Independent/ Mod I     Patient left with bed in chair position with all lines intact, call button in reach, bed alarm on and RN present.    Assessment:   Jaelyn Edmonds is a 81 y.o. female with a medical diagnosis of Hyponatremia. Upon initial PT evaluation, pt presents with decreased functional mobility, weakness, imbalance, decreased BLE function, decreased BUE function, impaired self care skills, and decreased endurance. Pt completed bed mobility with max A. Pt sat EOB x 15  minutes with varying assistance from max A to SBA. Pt would benefit from skilled PT services to address these deficits and improve return to PLOF. Anticipate d/c to HHPT.     Rehab identified problem list/impairments: Rehab identified problem list/impairments: weakness, impaired endurance, impaired self care skills, impaired functional mobilty, impaired balance, decreased coordination, decreased upper extremity function, decreased lower extremity function, decreased safety awareness, pain, decreased ROM, impaired fine motor, impaired muscle length    Rehab potential is fair.    Activity tolerance: Fair    Discharge recommendations: Discharge Facility/Level Of Care Needs: home health PT, home health OT     Barriers to discharge: Barriers to Discharge: None    Equipment recommendations: Equipment Needed After Discharge:  (TBD )     GOALS:    Physical Therapy Goals        Problem: Physical Therapy Goal    Goal Priority Disciplines Outcome Goal Variances Interventions   Physical Therapy Goal     PT/OT, PT Ongoing (interventions implemented as appropriate)     Description:  Goals to be met by: 17     Patient will increase functional independence with mobility by performin. Supine to sit with Moderate Assistance  2. Sit to supine with Moderate Assistance  3. Sit to stand transfer with Maximum Assistance  4. Bed to chair transfer with Maximum Assistance using LRAD  5. Wheelchair propulsion x 20 feet with Moderate Assistance using bilateral uppper extremities  6. Sitting at edge of bed x 5 minutes with Contact Guard Assistance                      PLAN:    Patient to be seen 3 x/week to address the above listed problems via gait training, therapeutic activities, therapeutic exercises, neuromuscular re-education  Plan of Care expires: 17  Plan of Care reviewed with: patient    Yadi IVAN Jv, PT  2017

## 2017-06-14 DIAGNOSIS — F32.9 REACTIVE DEPRESSION: ICD-10-CM

## 2017-06-14 RX ORDER — DULOXETIN HYDROCHLORIDE 30 MG/1
30 CAPSULE, DELAYED RELEASE ORAL DAILY
Qty: 30 CAPSULE | Refills: 11 | Status: SHIPPED | OUTPATIENT
Start: 2017-06-14 | End: 2018-06-14

## 2017-06-14 NOTE — PT/OT/SLP DISCHARGE
Physical Therapy Discharge Summary    Jaelyn Edmonds  MRN: [id number]   Hyponatremia   Patient Discharged from acute Physical Therapy on 17.  Please refer to prior PT noted date on 17 for functional status.     Assessment:   Patient appropriate for care in another setting.  GOALS:    Physical Therapy Goals        Problem: Physical Therapy Goal    Goal Priority Disciplines Outcome Goal Variances Interventions   Physical Therapy Goal     PT/OT, PT Ongoing (interventions implemented as appropriate)     Description:  Goals to be met by: 17     Patient will increase functional independence with mobility by performin. Supine to sit with Moderate Assistance  2. Sit to supine with Moderate Assistance  3. Sit to stand transfer with Maximum Assistance  4. Bed to chair transfer with Maximum Assistance using LRAD  5. Wheelchair propulsion x 20 feet with Moderate Assistance using bilateral uppper extremities  6. Sitting at edge of bed x 5 minutes with Contact Guard Assistance                    Reasons for Discontinuation of Therapy Services  Transfer to alternate level of care.      Plan:  Patient Discharged to: Home with Home Health Service.    Yadi Carias DPT, PT  2017

## 2017-06-15 DIAGNOSIS — G12.21 ALS (AMYOTROPHIC LATERAL SCLEROSIS): Primary | ICD-10-CM

## 2017-06-16 ENCOUNTER — OFFICE VISIT (OUTPATIENT)
Dept: PULMONOLOGY | Facility: CLINIC | Age: 82
End: 2017-06-16
Payer: MEDICARE

## 2017-06-16 ENCOUNTER — HOSPITAL ENCOUNTER (OUTPATIENT)
Dept: PULMONOLOGY | Facility: CLINIC | Age: 82
Discharge: HOME | End: 2017-06-16
Payer: MEDICARE

## 2017-06-16 ENCOUNTER — TELEPHONE (OUTPATIENT)
Dept: NEUROLOGY | Facility: CLINIC | Age: 82
End: 2017-06-16

## 2017-06-16 VITALS
HEIGHT: 65 IN | HEART RATE: 91 BPM | SYSTOLIC BLOOD PRESSURE: 164 MMHG | OXYGEN SATURATION: 97 % | BODY MASS INDEX: 19.66 KG/M2 | WEIGHT: 118 LBS | DIASTOLIC BLOOD PRESSURE: 68 MMHG

## 2017-06-16 DIAGNOSIS — G70.9 NEUROMUSCULAR RESPIRATORY WEAKNESS: Primary | ICD-10-CM

## 2017-06-16 DIAGNOSIS — G12.21 ALS (AMYOTROPHIC LATERAL SCLEROSIS): ICD-10-CM

## 2017-06-16 DIAGNOSIS — Z74.09 IMPAIRED MOBILITY AND ADLS: ICD-10-CM

## 2017-06-16 DIAGNOSIS — R14.0 ABDOMINAL DISTENSION: ICD-10-CM

## 2017-06-16 DIAGNOSIS — Z78.9 IMPAIRED MOBILITY AND ADLS: ICD-10-CM

## 2017-06-16 DIAGNOSIS — R06.02 SHORTNESS OF BREATH: Primary | ICD-10-CM

## 2017-06-16 DIAGNOSIS — R06.02 SHORTNESS OF BREATH: ICD-10-CM

## 2017-06-16 DIAGNOSIS — J99 NEUROMUSCULAR RESPIRATORY WEAKNESS: Primary | ICD-10-CM

## 2017-06-16 DIAGNOSIS — G12.21 ALS (AMYOTROPHIC LATERAL SCLEROSIS): Primary | ICD-10-CM

## 2017-06-16 LAB
PRE FEV1 FVC: 66
PRE FEV1 FVC: 79
PRE FEV1: 1.21
PRE FEV1: 1.45
PRE FVC: 1.82
PRE FVC: 1.83
PREDICTED FEV1 FVC: 76
PREDICTED FEV1 FVC: 76
PREDICTED FEV1: 2.12
PREDICTED FEV1: 2.12
PREDICTED FVC: 2.81
PREDICTED FVC: 2.81

## 2017-06-16 PROCEDURE — 1126F AMNT PAIN NOTED NONE PRSNT: CPT | Mod: ,,, | Performed by: INTERNAL MEDICINE

## 2017-06-16 PROCEDURE — 1159F MED LIST DOCD IN RCRD: CPT | Mod: ,,, | Performed by: INTERNAL MEDICINE

## 2017-06-16 PROCEDURE — 99999 PR PBB SHADOW E&M-EST. PATIENT-LVL III: CPT | Mod: PBBFAC,,, | Performed by: INTERNAL MEDICINE

## 2017-06-16 PROCEDURE — 99213 OFFICE O/P EST LOW 20 MIN: CPT | Mod: PBBFAC | Performed by: INTERNAL MEDICINE

## 2017-06-16 PROCEDURE — 94010 BREATHING CAPACITY TEST: CPT | Mod: 26,76,S$PBB, | Performed by: INTERNAL MEDICINE

## 2017-06-16 PROCEDURE — 99213 OFFICE O/P EST LOW 20 MIN: CPT | Mod: 25,S$PBB,, | Performed by: INTERNAL MEDICINE

## 2017-06-16 RX ORDER — SIMETHICONE 125 MG
125 CAPSULE ORAL 4 TIMES DAILY PRN
Refills: 0 | COMMUNITY
Start: 2017-06-16

## 2017-06-16 NOTE — TELEPHONE ENCOUNTER
----- Message from Ela Gunter sent at 6/16/2017  2:13 PM CDT -----  Contact: [place]/ [employer]  Requesting callback in regards to verifying if the PT needs a shower commode or a bedside commode.    Please call [phone].

## 2017-06-16 NOTE — PROGRESS NOTES
CHIEF COMPLAINT:  No chief complaint on file.    HISTORY OF PRESENT ILLNESS: Jaelyn Edmonds is a 81 y.o. female recently hospitalized with hyponatremia attributed to thiazide diuretic.  She notes chronic significant abdominal bloating which she feels affects her breathing.  Sleeps slightly elevated HOB.  Occ increased saliva.  Using cough assist 1-2 x / day- notes increased bloating after use.  Not taking simethicone.  Using lactulose.  No recent infections.  Has moved into [facility] here.          PAST MEDICAL HISTORY:    Past Medical History:   Diagnosis Date    ALS (amyotrophic lateral sclerosis)     Breast cancer 2011    Breast cancer - lumpectomy, radiation, no chemo; L    Hypertension     Hypothyroid        PAST SURGICAL HISTORY:    Past Surgical History:   Procedure Laterality Date    SPINE SURGERY  2015    L4-5 laminectomy       FAMILY HISTORY:                Family History   Problem Relation Age of Onset    Cancer Mother     Cancer Father     Cancer Sister     Cancer Daughter        SOCIAL HISTORY:          Occupation / Environment: living in [facility] / [place]  Social support: daughters Naeem, daughter in law Kerrie, caregiver Iman  History   Smoking Status    Former Smoker    Packs/day: 0.50    Years: 15.00    Types: Cigarettes    Quit date: 6/16/1980   Smokeless Tobacco    Never Used       ALLERGIES:    Review of patient's allergies indicates:   Allergen Reactions    Miralax [polyethylene glycol 3350]      bloating       CURRENT MEDICATIONS:    Current Outpatient Prescriptions   Medication Sig Dispense Refill    alprazolam (XANAX) 0.25 MG tablet Take 1 tablet (0.25 mg total) by mouth 3 (three) times daily. 90 tablet 3    amlodipine (NORVASC) 10 MG tablet Take 1 tablet (10 mg total) by mouth once daily. 60 tablet 6    ARMOUR THYROID 30 mg Tab Take 30 mg by mouth once daily.  0    cetirizine (ZYRTEC) 10 MG tablet Take 1 tablet (10 mg total) by mouth once  daily. 90 tablet 11    cyanocobalamin, vitamin B-12, 1,000 mcg/mL Kit Inject 1,000 mcg as directed 3 (three) times a week. (Patient taking differently: Inject 1,000 mcg as directed every Mon, Wed, Fri. ) 12 kit 5    duloxetine (CYMBALTA) 30 MG capsule Take 1 capsule (30 mg total) by mouth once daily. 30 capsule 11    fluticasone (FLONASE) 50 mcg/actuation nasal spray 2 sprays by Each Nare route once daily. 16 g 1    glycerin adult suppository Place 1 suppository rectally as needed for Constipation. 10 suppository 6    ibuprofen (ADVIL,MOTRIN) 800 MG tablet Take 800 mg by mouth 3 (three) times daily as needed for Pain.      lactulose (CHRONULAC) 20 gram/30 mL Soln Take 30 mLs (20 g total) by mouth every 6 (six) hours as needed. 900 mL 0    lisinopril (PRINIVIL,ZESTRIL) 40 MG tablet Take 1 tablet (40 mg total) by mouth once daily. 90 tablet 11    magnesium 30 mg Tab Take 1 tablet by mouth once daily.       multivitamin with iron-mineral Liqd Take 15 mLs by mouth once daily.      NYSTATIN (DUKE'S SOLUTION) Take 10 mLs by mouth 4 (four) times daily.      senna-docusate 8.6-50 mg (PERICOLACE) 8.6-50 mg per tablet Take 1 tablet by mouth 2 (two) times daily.      tramadol (ULTRAM) 50 mg tablet Take 1 tablet (50 mg total) by mouth 3 (three) times daily. 90 tablet 1    TURMERIC (CURCUMIN MISC) Take 1 tablet by mouth once daily.       zolpidem (AMBIEN) 5 MG Tab Take 1 tablet (5 mg total) by mouth nightly as needed. (Patient taking differently: Take 5 mg by mouth nightly as needed (for sleep). ) 30 tablet 1     No current facility-administered medications for this visit.                   REVIEW OF SYSTEMS:   Pulmonary related symptoms as per HPI.  Gen:  no weight loss, no fever, no night sweats  HEENT:  no sinus congestion, no dysphagia, + voice changes  CV:  no chest pain, no orthopnea, no paroxysmal nocturnal dyspnea  GI:  no melena, no hematochezia, no diarrhea, + constipation / chronic.  Neuro:  Not  "ambulatory       PHYSICAL EXAM:   Vitals:    06/16/17 1418   BP: (!) 164/68   Pulse: 91   SpO2: 97%   Weight: 53.5 kg (118 lb)   Height: 5' 5" (1.651 m)   PainSc: 0-No pain     GENERAL:  Alert, calm, in no  distress  HEENT:  Normal pupils, normal conjunctiva, normal EOM, nasal and oral mucosa normal, tongue normal / white coating  NECK:  supple; no palpable lymphadenopathy or masses,no jugular venous distention.  CVS: regular rate and rhythm, no murmers, gallops or rubs  PULM: Grossly decreased vital capacity, normal to percussion and palpation, clear to auscultation bilaterally with no wheezes, crackles or rhonchi, + accessory muscle usage w VC.  ABDOMEN:  soft nontender/nondistended,rise with inspiration  EXTREMITIES no cyanosis, clubbing or edema    CONTRIBUTORY STUDIES:       PULMONARY FUNCTION TESTS:  FVC 66% upright / supine, cap blood gas pCO2 35      ACTIVE PULMONARY PROBLEMS:    ICD-10-CM ICD-9-CM    1. 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  Neuromuscular respiratory mm weakness / observe  2.  Decreased cough efficacy but no chronic congestion- decrease frequency due to bloating/aerophagia during maneuver  3.  Abdominal gas increased and noted on recent abdominal radiograph.  Add simethicone.  Lactulose may exacerbate sxs.  Will revisit next week.  4.  Not ambulatory- will review VTE risk and treatment again next week.      No Follow-up on file.      [Greater than 50% of this 35 minute visit spent counseling patient and family]  "

## 2017-06-19 ENCOUNTER — TELEPHONE (OUTPATIENT)
Dept: INTERNAL MEDICINE | Facility: CLINIC | Age: 82
End: 2017-06-19

## 2017-06-19 DIAGNOSIS — G12.21 ALS (AMYOTROPHIC LATERAL SCLEROSIS): ICD-10-CM

## 2017-06-19 DIAGNOSIS — K59.00 CONSTIPATION, UNSPECIFIED CONSTIPATION TYPE: Primary | ICD-10-CM

## 2017-06-19 NOTE — TELEPHONE ENCOUNTER
----- Message from Raymond Obrien MA sent at 6/19/2017 11:56 AM CDT -----  Contact: self - [phone]  Type: Sooner appointment than  is able to schedule    When is the first available appointment? 7/28/17    What is the nature of the appointment? F/u  - per Urology     What appointment type:    Comments: within 2 weeks. Please call. Thanks!

## 2017-06-21 ENCOUNTER — TELEPHONE (OUTPATIENT)
Dept: REHABILITATION | Facility: HOSPITAL | Age: 82
End: 2017-06-21

## 2017-06-21 ENCOUNTER — DOCUMENTATION ONLY (OUTPATIENT)
Dept: NUTRITION | Facility: CLINIC | Age: 82
End: 2017-06-21

## 2017-06-21 DIAGNOSIS — R60.9 EDEMA, UNSPECIFIED TYPE: ICD-10-CM

## 2017-06-21 DIAGNOSIS — G12.21 ALS (AMYOTROPHIC LATERAL SCLEROSIS): Primary | ICD-10-CM

## 2017-06-21 NOTE — PROGRESS NOTES
MNT:    Attempted to contact pt by phone 2/2 ALS Clinic cancellation today due to weather; left voice mail for return call if pt has any nutrition-related questions/concerns which she would like addressed prior to her next clinic visit.

## 2017-06-23 ENCOUNTER — TELEPHONE (OUTPATIENT)
Dept: NEUROLOGY | Facility: CLINIC | Age: 82
End: 2017-06-23

## 2017-06-23 NOTE — TELEPHONE ENCOUNTER
I've never prescribed that medication. Would prefer that she see GI for this issue and have made a referral to them.  DL

## 2017-06-23 NOTE — TELEPHONE ENCOUNTER
Received call from patient's daughter, Naeem, and [facility] regarding injection for constipation, Relistor. Mrs. Edmonds is interested in receiving the injection versus the suppositories and enemas.

## 2017-06-26 NOTE — TELEPHONE ENCOUNTER
RN Coordinator informed patient's Daughter Naeem regarding GI referral for further work up per Dr. Mi. Left message with patient regarding the referral and the cancellation of Urgent Care appt done through [facility].

## 2017-06-27 ENCOUNTER — TELEPHONE (OUTPATIENT)
Dept: NEUROLOGY | Facility: CLINIC | Age: 82
End: 2017-06-27

## 2017-06-27 ENCOUNTER — DOCUMENTATION ONLY (OUTPATIENT)
Dept: NEUROLOGY | Facility: CLINIC | Age: 82
End: 2017-06-27

## 2017-06-27 NOTE — PROGRESS NOTES
Pt completed application for brain and spinal cord donation through the [facility]. SW reviewed all the paperwork with pt and mailed off application on pt's behalf. Included with application was pt's EMG and most recent chart notes from ALS clinic- signed release of information forms and HIPAA forms also included. GRUPO spoke with Iman at the [facility] ([phone])  who is expecting the packet and will confirm with SW when it is received.  ALS clinic will have to coordinate with [facility] and hospice if pt elects that benefit when it becomes appropriate. Otherwise, ALS clinic will have to coordinate with hospital or local  home when the time is appropriate for the donation process to run smoothly. GRUPO will continue to assist PRN.

## 2017-06-27 NOTE — TELEPHONE ENCOUNTER
Patient refused chair through [employer]... Chair does not meet patient's needs. Per patient unable to return chair to company. RN Coordinator sent text message of 5 different shower chairs to patient per her request with full back support and arm rest. Also, daughter Naeem informed. Naeem stated that [facility] ([facility]) was looking into chairs provided by the Assisted Living Facility. RN contacted [facility] who stated chairs were returned due to residents not utilizing the shower chairs. Contacted other vendors to get pricing on shower chairs for patient. Awaiting returned call.     RN left message on yesterday for patient after speaking with Naeem (patient's daughter)... To inform that MD would like patient referred to GI for follow up.

## 2017-07-10 ENCOUNTER — OFFICE VISIT (OUTPATIENT)
Dept: GASTROENTEROLOGY | Facility: CLINIC | Age: 82
End: 2017-07-10
Payer: MEDICARE

## 2017-07-10 VITALS
DIASTOLIC BLOOD PRESSURE: 67 MMHG | WEIGHT: 114 LBS | HEIGHT: 65 IN | BODY MASS INDEX: 18.99 KG/M2 | HEART RATE: 95 BPM | SYSTOLIC BLOOD PRESSURE: 121 MMHG

## 2017-07-10 DIAGNOSIS — K59.03 THERAPEUTIC OPIOID-INDUCED CONSTIPATION (OIC): Primary | ICD-10-CM

## 2017-07-10 DIAGNOSIS — T40.2X5A THERAPEUTIC OPIOID-INDUCED CONSTIPATION (OIC): Primary | ICD-10-CM

## 2017-07-10 PROCEDURE — 99204 OFFICE O/P NEW MOD 45 MIN: CPT | Mod: S$PBB,,, | Performed by: INTERNAL MEDICINE

## 2017-07-10 PROCEDURE — 1126F AMNT PAIN NOTED NONE PRSNT: CPT | Mod: ,,, | Performed by: INTERNAL MEDICINE

## 2017-07-10 PROCEDURE — 99212 OFFICE O/P EST SF 10 MIN: CPT | Mod: PBBFAC,PN | Performed by: INTERNAL MEDICINE

## 2017-07-10 PROCEDURE — 1159F MED LIST DOCD IN RCRD: CPT | Mod: ,,, | Performed by: INTERNAL MEDICINE

## 2017-07-10 PROCEDURE — 99999 PR PBB SHADOW E&M-EST. PATIENT-LVL II: CPT | Mod: PBBFAC,,, | Performed by: INTERNAL MEDICINE

## 2017-07-10 NOTE — PROGRESS NOTES
Subjective:       Patient ID: Jaelyn Edmonds is a 81 y.o. female.    Chief Complaint: Hospital Follow Up; Constipation; Bloated; and Weight Loss       This is a 81-year-old female here for evaluation of constipation.  She's a history of ALS and is on tramadol for arthralgias.  She has noted constipation described as infrequent, hard stools since starting her pain medication.  She has limited mobility as well.  Her family is present to give additional history.  She has not responded to lactulose also takes senna.  Other associated symptoms include nausea.  No bleeding.  Her last colonoscopy was within the last 5 years she states was unremarkable other than having a tortuous colon.  No bleeding.  No other exacerbating or relieving factors or other associated symptoms.    The following portions of the patient's history were reviewed and updated as appropriate: allergies, current medications, past family history, past medical history, past social history, past surgical history and problem list.    (Portions of this note were dictated using voice recognition software and may contain dictation related errors in spelling/grammar/syntax not found on text review)  HPI  Review of Systems   Constitutional: Positive for appetite change and fatigue. Negative for fever.   HENT: Negative for postnasal drip and trouble swallowing.    Eyes: Negative for pain and redness.   Respiratory: Negative for chest tightness and shortness of breath.    Cardiovascular: Positive for leg swelling. Negative for chest pain.   Gastrointestinal: Positive for abdominal distention, abdominal pain and constipation. Negative for anal bleeding, diarrhea and nausea.   Endocrine: Negative for cold intolerance and heat intolerance.   Genitourinary: Negative for difficulty urinating and hematuria.   Musculoskeletal: Positive for arthralgias. Negative for neck pain.   Skin: Negative for color change and pallor.   Allergic/Immunologic: Negative for  environmental allergies and food allergies.   Neurological: Negative for facial asymmetry and light-headedness.   Hematological: Negative for adenopathy. Does not bruise/bleed easily.   Psychiatric/Behavioral: Negative for agitation and behavioral problems.       Objective:      Physical Exam   Constitutional: She is oriented to person, place, and time. She appears well-developed and well-nourished. No distress.   HENT:   Head: Normocephalic and atraumatic.   Eyes: Conjunctivae are normal. No scleral icterus.   Neck: Normal range of motion. Neck supple. No tracheal deviation present. No thyromegaly present.   Cardiovascular: Normal rate and regular rhythm.  Exam reveals no gallop and no friction rub.    No murmur heard.  Pulmonary/Chest: Effort normal and breath sounds normal. No respiratory distress. She has no wheezes.   Abdominal: Soft. Bowel sounds are normal. She exhibits no distension. There is no tenderness.   Musculoskeletal:        Right wrist: She exhibits normal range of motion and no tenderness.        Left wrist: She exhibits normal range of motion and no tenderness.   Lymphadenopathy:        Head (right side): No submental and no submandibular adenopathy present.        Head (left side): No submental and no submandibular adenopathy present.   Neurological: She is alert and oriented to person, place, and time.   Skin: Skin is warm and dry. No rash noted. She is not diaphoretic. No erythema.   Psychiatric: She has a normal mood and affect. Her behavior is normal.   Nursing note and vitals reviewed.      Assessment:       1. Therapeutic opioid-induced constipation (OIC)        Plan:   1. Multifactorial, worse with opioids  2. They will message me a symptom update in 1-2 weeks  3. D/c lactulose  4. Trial of relistor

## 2017-07-14 ENCOUNTER — DOCUMENTATION ONLY (OUTPATIENT)
Dept: NEUROLOGY | Facility: CLINIC | Age: 82
End: 2017-07-14

## 2017-07-14 NOTE — PROGRESS NOTES
Spoke with Toya at [facility]- they received pt's application packet- one page seems to be missing from original packet that they still need. Toya is faxing it to SW who will get pt to sign and will send back. Once last page is sent it, they will send pt a donor card to keep on hand. Pt must select a  home she would like to use so [facility] can coordinate with them for the donation process. SW will f/u

## 2017-07-19 ENCOUNTER — TELEPHONE (OUTPATIENT)
Dept: GASTROENTEROLOGY | Facility: CLINIC | Age: 82
End: 2017-07-19

## 2017-07-19 NOTE — TELEPHONE ENCOUNTER
----- Message from Pia Padilla LPN sent at 7/19/2017  9:41 AM CDT -----  Contact: [phone]/ self       ----- Message -----  From: Matilda Rowland  Sent: 7/19/2017   9:29 AM  To: Alida Rojas Staff    Patient called in requesting to speak with you. Did not specify why. Please advise.

## 2017-07-19 NOTE — TELEPHONE ENCOUNTER
----- Message from Matilda Rowland sent at 7/19/2017  9:29 AM CDT -----  Contact: [phone]/ self   Patient called in requesting to speak with you. Did not specify why. Please advise.

## 2017-07-25 ENCOUNTER — TELEPHONE (OUTPATIENT)
Dept: GASTROENTEROLOGY | Facility: CLINIC | Age: 82
End: 2017-07-25

## 2017-07-25 NOTE — TELEPHONE ENCOUNTER
----- Message from Matilda Rowland sent at 7/25/2017  1:09 PM CDT -----  Contact: [phone]/Kerrie pt's daughter   Pt's daughter its requesting to speak with the nurse in regarding pt's medical situation , states its urgent . Please advise

## 2017-07-25 NOTE — TELEPHONE ENCOUNTER
----- Message from Lashell Dianna sent at 7/24/2017  4:47 PM CDT -----  Contact: Lawrence Rosen, [phone]  States patient had a bowel movement and requests to know if they should continue Lynzess and if they can have a prescription for hemorrhoids cream. Please advise.

## 2017-08-07 DIAGNOSIS — M79.605 BILATERAL LEG PAIN: ICD-10-CM

## 2017-08-07 DIAGNOSIS — M79.604 BILATERAL LEG PAIN: ICD-10-CM

## 2017-08-07 DIAGNOSIS — M54.50 CHRONIC MIDLINE LOW BACK PAIN WITHOUT SCIATICA: ICD-10-CM

## 2017-08-07 DIAGNOSIS — G89.29 CHRONIC MIDLINE LOW BACK PAIN WITHOUT SCIATICA: ICD-10-CM

## 2017-08-07 RX ORDER — TRAMADOL HYDROCHLORIDE 50 MG/1
TABLET ORAL
Qty: 90 TABLET | Refills: 5 | Status: SHIPPED | OUTPATIENT
Start: 2017-08-07 | End: 2017-08-08 | Stop reason: SDUPTHER

## 2017-08-07 RX ORDER — ZOLPIDEM TARTRATE 5 MG/1
TABLET ORAL
Qty: 90 TABLET | Refills: 3 | Status: SHIPPED | OUTPATIENT
Start: 2017-08-07 | End: 2017-08-08 | Stop reason: SDUPTHER

## 2017-08-08 DIAGNOSIS — G89.29 CHRONIC MIDLINE LOW BACK PAIN WITHOUT SCIATICA: ICD-10-CM

## 2017-08-08 DIAGNOSIS — M79.604 BILATERAL LEG PAIN: ICD-10-CM

## 2017-08-08 DIAGNOSIS — M79.605 BILATERAL LEG PAIN: ICD-10-CM

## 2017-08-08 DIAGNOSIS — M54.50 CHRONIC MIDLINE LOW BACK PAIN WITHOUT SCIATICA: ICD-10-CM

## 2017-08-08 RX ORDER — TRAMADOL HYDROCHLORIDE 50 MG/1
TABLET ORAL
Qty: 90 TABLET | Refills: 0 | Status: SHIPPED | OUTPATIENT
Start: 2017-08-08

## 2017-08-09 RX ORDER — ZOLPIDEM TARTRATE 5 MG/1
TABLET ORAL
Qty: 30 TABLET | Refills: 1 | Status: SHIPPED | OUTPATIENT
Start: 2017-08-09

## 2017-08-11 ENCOUNTER — TELEPHONE (OUTPATIENT)
Dept: GASTROENTEROLOGY | Facility: CLINIC | Age: 82
End: 2017-08-11

## 2017-08-11 NOTE — TELEPHONE ENCOUNTER
----- Message from Angelique Grant sent at 8/11/2017  4:06 PM CDT -----  Contact: chong serna [phone]  Please call regarding patient

## 2017-08-16 ENCOUNTER — OFFICE VISIT (OUTPATIENT)
Dept: NEUROLOGY | Facility: CLINIC | Age: 82
End: 2017-08-16
Payer: MEDICARE

## 2017-08-16 VITALS — OXYGEN SATURATION: 96 %

## 2017-08-16 DIAGNOSIS — M41.9 SCOLIOSIS OF THORACIC SPINE, UNSPECIFIED SCOLIOSIS TYPE: ICD-10-CM

## 2017-08-16 DIAGNOSIS — G12.21 ALS (AMYOTROPHIC LATERAL SCLEROSIS): Primary | ICD-10-CM

## 2017-08-16 DIAGNOSIS — R47.1 DYSARTHRIA: ICD-10-CM

## 2017-08-16 DIAGNOSIS — Z78.9 IMPAIRED MOBILITY AND ACTIVITIES OF DAILY LIVING: ICD-10-CM

## 2017-08-16 DIAGNOSIS — R13.12 OROPHARYNGEAL DYSPHAGIA: ICD-10-CM

## 2017-08-16 DIAGNOSIS — M54.50 CHRONIC MIDLINE LOW BACK PAIN WITHOUT SCIATICA: ICD-10-CM

## 2017-08-16 DIAGNOSIS — G89.29 CHRONIC MIDLINE LOW BACK PAIN WITHOUT SCIATICA: ICD-10-CM

## 2017-08-16 DIAGNOSIS — Z74.09 IMPAIRED MOBILITY AND ACTIVITIES OF DAILY LIVING: ICD-10-CM

## 2017-08-16 PROCEDURE — 1159F MED LIST DOCD IN RCRD: CPT | Mod: ,,, | Performed by: PSYCHIATRY & NEUROLOGY

## 2017-08-16 PROCEDURE — 99214 OFFICE O/P EST MOD 30 MIN: CPT | Mod: S$PBB,,, | Performed by: PHYSICAL MEDICINE & REHABILITATION

## 2017-08-16 PROCEDURE — 99999 PR PBB SHADOW E&M-EST. PATIENT-LVL II: CPT | Mod: PBBFAC,,,

## 2017-08-16 PROCEDURE — 99215 OFFICE O/P EST HI 40 MIN: CPT | Mod: S$PBB,,, | Performed by: PSYCHIATRY & NEUROLOGY

## 2017-08-16 PROCEDURE — 99213 OFFICE O/P EST LOW 20 MIN: CPT | Mod: S$PBB,,, | Performed by: INTERNAL MEDICINE

## 2017-08-16 PROCEDURE — 99212 OFFICE O/P EST SF 10 MIN: CPT | Mod: PBBFAC

## 2017-08-16 NOTE — PROGRESS NOTES
GRUPO met with pt, dtr and granddtr on this date for ALS clinic. Pt is living at [facility] with spouse. [facility] has been out for info session and family is now requesting second info visit. Guardian will reach out to Naeem to set up additional visit. SW had pt sign HIPAA release for [facility] and will send paperwork in on pt's behalf. Pt will then be completely registered for brain and spinal cord donation. When pt transitions to hospice GRUPO will coordinate with hospice SWer to make sure they are aware of pt's end of life plans. Pt is unsure if she wants a PEG placed and SW discussed her hospice benefit in relation to PEG- pt would have to have the procedure done prior to hospice benefit. GRUPO remains available to provide emotional support and connect pt and family to community resources PRN.

## 2017-08-16 NOTE — PROGRESS NOTES
Subjective:       Patient ID: Jaelyn Edmonds is a 81 y.o. female.    Chief Complaint: No chief complaint on file.    HPI     HISTORY OF PRESENT ILLNESS:  Mrs. Edmonds is a 81-year-old white female with ALS diagnosed in 06/2016.  Her past medical history is also significant for hypertension, hypothyroidism, chronic low back pain secondary to DJD and scoliosis.  She is followed up in the ALS clinic for multidisciplinary care. Her last visit was on 3/15/17.  She was maintained on prn  ibuprofen and tramadol.    The patient is coming today to the clinic for follow up.  Her back pain has been stable.  It is an intermittent aching pain in the lumbar spine.  She denies any radiation to her legs.  It is worse with activity.  It is relieved by sitting down or lying down.  Her maximum pain is 7-8/10 and minimum pain is 0-1/10.  Today, it is 0-1/10.  The patient reports worsening of her lower extremity strength.  She denies any bowel or bladder incontinence.      She is currently taking tramadol 50 mg p.r.n., usually three times per day.  She has not been taking ibuprofen since tramadol is helping. She reports only mild sedation with its use.      Review of Systems   Constitutional: Positive for fatigue.   Eyes: Negative for visual disturbance.   Respiratory: Positive for shortness of breath.    Cardiovascular: Negative for chest pain.   Gastrointestinal: Positive for constipation. Negative for blood in stool, nausea and vomiting.   Genitourinary: Negative for difficulty urinating.   Musculoskeletal: Positive for back pain, gait problem and neck pain. Negative for arthralgias.   Neurological: Negative for dizziness and headaches.   Psychiatric/Behavioral: Positive for sleep disturbance. Negative for behavioral problems.       Objective:      Physical Exam   Constitutional: She appears well-developed and well-nourished. No distress.   HENT:   Head: Normocephalic and atraumatic.   Neck: Normal range of motion.    Musculoskeletal:   BUE:  ROM:full.  Strength:    RUE: 4/5 at shoulder abduction, 5 elbow flexion, 5 elbow extension, 5 hand .   LUE: 4/5 at shoulder abduction, 5 elbow flexion, 5 elbow extension, 5 hand .  Sensation to pinprick:   RUE: intact.   LUE: intact.    BLE:  ROM:full.  Strength:    RLE: 1/5 at hip flexion, 1 knee extension, 1 ankle DF, 1 PF.   LLE: 1/5 at hip flexion, 1 knee extension, 1 ankle DF, 1 PF.  Sensation to pinprick:     RLE: intact.      LLE: intact.     -ve tenderness over lumbar spine.         Neurological: She is alert.   Skin: Skin is warm.   Psychiatric: She has a normal mood and affect.   Vitals reviewed.      Assessment:       1. ALS (amyotrophic lateral sclerosis)    2. Impaired mobility and activities of daily living    3. Chronic midline low back pain without sciatica    4. Scoliosis of thoracic spine, unspecified scoliosis type        Plan:     - Continue ibuprofen 200 mg, 1-2 tablets (or equivalent in liquid form) 2-3 times per day as needed for mild to moderate pain.  - Continue tramadol (ULTRAM) 50 mg tablet; Take 1 tablet (50 mg total) by mouth once daily as needed for severe pain.  - The patient or her daughter may call as needed if something stronger is required (she cannot tolerate tylenol or codeine).  - Follow up in ALS clinic.

## 2017-08-16 NOTE — PROGRESS NOTES
Date: 08/16/2017    Start Time: 9:00  Stop Time:  9:50      ALS Clinic: 4      OUTPATIENT NEUROLOGICAL REHABILITATION  SPEECH THERAPY SCREENING  ALS MULTIDISCIPLINARY CLINIC    HISTORY AND SUBJECTIVE    Onset Date: RLE symptoms in June 2015, Diagnosed in July 2016  Primary Diagnosis:  ALS  Treatment Diagnosis:  Dysarthria and pharyngeal dysphagia  Referring Provider: Dr. Mi  Orders: Ambulatory referral for ST evaluate and treat  Past Medical History:   Past Medical History:   Diagnosis Date    ALS (amyotrophic lateral sclerosis)     Breast cancer 2011    Breast cancer - lumpectomy, radiation, no chemo; L    Hypertension     Hypothyroid      History of Present Illness: Mrs. Edmonds presents to the [facility] clinic secondary to the diagnosis of ALS Disease. Her daughter, Olga, and granddaughter, Kamlesh, were present today.   Functional Deficits Leading to Referral/Nature of Injury:  Increased decline in vocal quality, speech, and swallowing  Precautions:  Swallowing precautions   Prior Therapy:  Home Health speech therapy for a few months in the past. Currently receiving Home Health PT and Home Health OT.  Pain:  2/10, pain in her stomach  Nutrition:  Regular consistency diet with thin liquids. She reported that she has significant decreased appetite and does not eat much. She has lost weight since last clinic. She is considering a peg tube.   Environmental Concerns/Cultural/Spiritual/Developmental/Educational Needs: None  Social History:  Mrs. Edmonds lives at home with her  in an Assisted Living Facility. She has a sitter at least part time.    OBJECTIVE  Current Assessment:   Auditory Comprehension: Not formally assessed but appeared to be within functional limits for conversation.    Reading Comprehension: Not formally assessed. No concerns reported.    Verbal Expression: Not formally assessed but ppeared to be within functional limits for conversation.    Written  Expression: Not formally assessed.    Cognition: Not formally assessed but appeared to be within functional limits for conversation.    Motor Speech/Fluency/Voice: Oral motor exam revealed: strength and range of motion were observed to be more reduced for labial, lingual, mandibular, and buccal functions. Mild lingual tremors were noted. Velum elevation was reduced for sustained and alternating elevation. Diadochokinetic (DDK) rates for rapid repetition of 1 - 3 syllable utterances were mildly reduced and articulation was imprecise and uncoordinated. Vocal quality was hoarse during the session. Decreased breath and speech coordination was noted with labored breathing. Speech was slower but still intelligible per observation and family report. Vocal intensity was low. Mrs. Edmonds has a voice amplifier but does not used it because she said the head set does not fit properly. She was encouraged to use it to help with vocal intensity.  Fluency was within normal limits. Mrs. Edmonds was intelligible most of the time during the evaluation. However there has been a decline in her over motor speech function.      Augmentative/Alternative Communication (AAC): None used at this time. Mrs. Edmonds declined voice banking in the past. It was suggested that she use the Text to Speak communication ross and/or a voice amplifier when her vocal intensity and strength decline. She has a voice amplifier to use but she has not used it yet. A boogie board and text to speak ross for the phone and ipad were recommended.    Swallowing: Clinical Swallowing Evaluation: Mrs. Edmonds was given water, applesauce, and a cracker. Oral phase: Coating on the tongue observed with complaint of a dry mouth. Slow mastication of the cracker. No anterior loss of liquid or solids but some residue noted after the cracker. Pharyngeal phase: Decreased laryngeal elevation was palpated with very audible, multiple swallows for all consistencies. When given cup  "and straw sips of thin liquids, watery eyes and wet vocal quality was noted. When given nectar thick liquids, audible, multiple swallows evident with wet vocal quality. Throat clearing present through out the session. Weak cough present when drinking from the straw with thin and thickened liquids. She needed liquid washes to clear the cracker.  She spontaneously turned her head to the right when swallowing as a swallowing strategy suggested in the past. Alternate means of primary means of nutrition with pleasure oral feedings should be considered.     NOTE: Mrs. Edmonds participated in a Modified Barium Swallow Study (MBSS) on 05/04/17. See report for details. "Impressions:   Pt with moderate pharyngeal dysphagia c/b penetration of second swallow of 5cc thin liquids and subsequent thin liquid trials with no strategy utilized; decreased BOT retraction and pharyngeal wall constriction resulting in trace-mild BOT and vallecular residue with thin liquids and solid trials, moderate pharyngeal wall and vallecular residue with nectar thick liquids, and moderate vallecular and piriform sinus residue with puree trials. Pharyngeal residue increases as viscosities increase. No noted aspiration or penetration with thin liquid trials with a head turn to the right, puree, dental soft, regular, or barium tablet trials. Recommendations/Precautions: 1. Regular diet, 2. Thin liquids WITH A HEAD TURN TO THE RIGHT, 3. Double swallows with thicker viscosities, 4. Alternate sips/bites, 5. Upright for all po intake and remain upright for 20 minutes s/p po intake, 6. Small bites/sips, 7. Slow rate, 8. Monitor for s/s of aspiration"    Hearing: Not formally assessed. Appeared to be within functional limits for conversational speech.     Education: Mrs. Edmonds and her daughter were educated on recommendations, swallowing precautions, use of voice amplifier, communication ross (text to speak) and speech compensatory strategies. They " verbalized understanding and agreed with the plan of care    ASSESSMENT  Impressions: Mrs. Edmonds exhibited dysarthria and oropharyngeal dysphagia secondary to her diagnosis of ALS. She has displayed a decline in her speech and swallowing skills since last clinic. Her deficits were characterized by oral motor weakness, reduced velar elevation,labored breathing, poor speech and breath coordination,  hoarse vocal quality, and low vocal intensity. She reported increased hoarseness and consonant imprecision with fatigued. Mrs. Edmonds currently eats a regular consistency diet with thin liquids when she eats but her appetite has declined and she does not eat much.  Alternate means of primary nutrition should be considered with pleasure feedings. No further speech therapy services are recommended at this time. Follow up with [facility] in three months.     Functional Communication Measure (FCM):   Severity Modifier for Medicare G-Code:  Motor Speech  Motor Speech  Current status: FCM:  Level 5   - CJ   Projected status:  FCM:   Level 5   - CJ   Discharge status:  FCM:   Level 5   - CJ     Swallowing  Current status:  FCM:  Level 4   - CK  Projected status:  FCM:   Level 4   - CK  Discharge status:  FCM:   Level 4   -  CK         PLAN  Recommended Treatment Plan: No speech therapy services at this time. Follow up with [facility] in 3 months.      Other Recommendations: 1. Use voice amplifier to increase vocal intensity. 2. Consider Alternative means of primary nutrition.    No charge made to this account since she is actively receiving home health services.     DANIEL Lundberg, CCC-SLP  Speech-Language Pathologist  Outpatient Neurological Rehabilitation      Date: 08/16/2017

## 2017-08-16 NOTE — PROGRESS NOTES
Physical Therapy Screen/Assessment at ALS clinic   Time In: 9:55AM  Time out: 10:20AM     Jaelyn is a 81 y.o. female that presents to [facility] secondary to dx of ALS. Pt was first diagnosed approximately July 2016. Pt first noticed symptoms in June 2015.    This patient is currently receiving PT and OT in an assisted living facility, so PT Re-evaluation was not completed today. This is the 4th time at this ALS clinic, however pt declined PT screen during last clinic visit.. Pt's daughter Ogla and pt's granddaughter were present.     Chief complaints: pt reports she feels like her trunk has gotten very weak. Pt states she feels like she will fall over if no one is assisting her while seated at the edge of the bed. Pt also reports difficulty breathing and like air is trapped in her stomach. Pt is no longer ambulatory. Pt reports it takes 2 techs to assist her with all transfers, but she feels confident with them assisting her. Pt states that the assisted living facility does not allow use of Nikolas lift.    Pain: chronic back and LE pain. Rates it as a 1-2 discomfort. Pt also reports intermittent pain at the coccyx    Current functional level:  Mrs. Edmonds reports that her legs have no strength and she is non-ambulatory. Pt has PT and OT 1 day/week. Pt needs total assist x 2 for all transfers. Pt has a custom W/C but she arrived in a manual W/C. Pt reports that she didn't feel comfortable in it, but the WC rep made some improvements, and it is a little better now.  Pt has transport WC and PWC. She is using a BSC for toileting. Pt transfers to a shower chair for bathing. Pt does not have 24 hour S in assisted living. Pt does have a call sahni.  Pt denies falls    Home setup: Lives with  who has his own medical deficits.  They recently moved into an assisted living facility. Pt does not have 24 hr S at this time.    Equipment/DME: transport Wc, Power Wc,  BSC and shower chair for  bathing.  Pt in a bed with trapeze, and adjustable head and feet    Falls: None since last clinic.     Edema: B LEs, most noted in ankles and feet. PT reinforced elevating LE's for edema management    Sensation: Intact    Tone: decreased throughout B LE's and UEs     Sitting balance static: good  Sitting balance dynamic: fair- poor  Standing balance static: poor  Standing balance dynamic:  Unable    Seated functional reach: 11inches    Lower Extremity Strength   Right LE  Left LE    Hip flexion: 0/5 Hip flexion: 0/5   Hip abduction 0/5 Hip abduction 0/5   Hip adduction 0/5 Hip adduction 0/5   Knee extension: 0/5 Knee extension: 0/5   Knee flexion: 0/5 Knee flexion: 0/5   Ankle dorsiflexion: 0/5 Ankle dorsiflexion: 0/5   Ankle PF 0/5 Ankle PF 0/5     Lower Extremity ROM: Passive WNL, except for pt has 3 degrees of passive L ankle Df; 13 degrees of R ankle DF  * pt reports using multi-podus boots x 4 hours/day to prevent contractures. PT educated pt on having caregivers assist with skin checks    HEP/Activity levels: Pt is working with OT and PT in assisted living    Recommendations:   Continue PT/OT in assisted living  Edema management and positional changes  Contacting PWC company if pt feels more adjustments need to be made  Increasing use of PWC during the day for improved trunk support, as this may improve pt's feeling of dyspnea, as well as for edema management and skin integrity/positional changes  Having family assist with PROM and AAROM that PT and OT are providing, as pt states she would like PT and OT more frequently.    Pt with good understanding of above.    Jennie Espinoza, PT  08/16/2017

## 2017-08-16 NOTE — PROGRESS NOTES
Subjective:       Patient ID: Jaelyn Edmonds is a 81 y.o. female.    Chief Complaint:  ALS    History of Present Illness  HPI  Jaelyn Edmonds is a 82 yo female with ALS, symptom onset RLE 6/2015, dx 7/2016. She notes bloating and constipation is somewhat better on new regimen. Having BM 2-3 x/week. Recent admission for above issues 6/12-6/13, has also seen GI. Notes her upper extremities are weaker, having increased difficulty holding pen to write. Currently residing in an assisted living facility with her .       Review of Systems  Review of Systems    Objective:   SpO2 96%      Neurologic Exam    Physical Exam    ALS Physical Exam PBA Speech Facial Strength Tongue Strength Tongue Appear GPS Jaw Jerk   3/15/2017 No mild normal normal normal;fasic No No      ALS Physical Exam (Continued) Limb Fasciculations Neck Flex HHD Neck Flex MMT Neck Ext HHD Neck Ext MMT Shd Abd R HHD Shd Abd R MMT   3/15/2017 Present 16.7 4+ 19.5 5 20.4 4+      ALS Physical Exam (Continued) Shd Abd L HHD Shd Abd L MMT Wrist Ext R HHD Wrist Ext R MMT Wrist Ext L HHD Wrist Ext L MMT Hand  R (kg)   3/15/2017 14.9 4+ 14.3 5 13.3 5 14      ALS Physical Exam (Continued) Hand  L (kg) Hip Flex R HHD Hip Flex R MMT Hip Flex L HHD Hip Flex L MMT Knee Ext R HHD Knee Ext R MMT   3/15/2017 20 0 1 0 1 22.8 4      ALS Physical Exam (Continued) Knee Ext L HHD Knee Ext L MMT Foot DF R HHD Foot DF R MMT Foot DF L HHD Foot DF L MMT UMN Signs Arms Type   3/15/2017 24.1 4 0 0 0 0 increased dtrs      ALS Physical Exam (Continued) UMN Signs Legs UMN Signs Legs Type   3/15/2017 Yes increased dtrs       ALSFRS Score:  16  FRS-6 Score:  3                Impression:        1. ALS (amyotrophic lateral sclerosis)    2. Impaired mobility and activities of daily living    3. Chronic midline low back pain without sciatica    4. Scoliosis of thoracic spine, unspecified scoliosis type    5. Dysarthria    6. Oropharyngeal dysphagia           Recommendations:         -hospice info visit at new assisted living home  -pt to decide about PEG placement   -trilogy, if pt wishes/approved at assisted living facility   -RTC 3 mos       Britt Degroot PA-C  Department of Neurology   [facility] [place], [place]        I have personally taken the history and examined the patient and agree with the PA's note as stated above.    Spoke with patient and her daughter at length today regarding end of life issues. She feels as though she is suffering, but does not wish to leave her family. She is uncertain about PEG placement, and at this time, does not wish to have it done.     Discussed Code Status, and together we filled out a [place] POST. She is DNR/DNI and comfort measures only. Provided her with signed forms for her assisted living facility.    She and her daughter will discuss whether the assisted living facility will allow PEG or trilogy.     She would like a visit from Hospice.     Cesario Mi MD, AMINA, FAAN  Department of Neurology   [facility] [place], [place]

## 2017-08-16 NOTE — PROGRESS NOTES
CHIEF COMPLAINT:  No chief complaint on file.    HISTORY OF PRESENT ILLNESS: Jaelyn Edmonds is a 81 y.o. female with ALS, history of breast cancer with local radiation, HTN who returns for followup of her motor neuron disease for evaluation of respiratory status. No infections or complications since her last visit. Not ambulatory.  Not using cough assist routinely. Dry mouth- biotine spray.  Constipation better controlled with senakot.  Still abdominal distension- sensation that it impedes breathing.  No orthopnea.  More difficulty swallowing.  Slurred speech.      PAST MEDICAL HISTORY:    Past Medical History:   Diagnosis Date    ALS (amyotrophic lateral sclerosis)     Breast cancer 2011    Breast cancer - lumpectomy, radiation, no chemo; L    Hypertension     Hypothyroid        PAST SURGICAL HISTORY:    Past Surgical History:   Procedure Laterality Date    SPINE SURGERY  2015    L4-5 laminectomy       FAMILY HISTORY:                Family History   Problem Relation Age of Onset    Cancer Mother     Colon polyps Mother     Cancer Father     Colon cancer Father     Cancer Sister     Cancer Daughter     Colon cancer Maternal Grandfather     Colon cancer Paternal Grandfather        SOCIAL HISTORY:          Social support: . presents with daughter Olga (here today with her daughter Kamlesh) and close friend Megan (shon godmother-son had cervical diving injury recently) ( daughter-in-law Kerrie). Her sister lives nearby.  with active illnesses- CAD stents recently. [facility].  History   Smoking Status    Former Smoker    Packs/day: 0.50    Years: 15.00    Types: Cigarettes    Quit date: 6/16/1980   Smokeless Tobacco    Never Used       ALLERGIES:  Review of patient's allergies indicates:  No Known Allergies    CURRENT MEDICATIONS:    Current Outpatient Prescriptions   Medication Sig Dispense Refill    alprazolam (XANAX) 0.25 MG tablet Take 1  tablet (0.25 mg total) by mouth 3 (three) times daily. 90 tablet 3    amlodipine (NORVASC) 10 MG tablet Take 1 tablet (10 mg total) by mouth once daily. 60 tablet 6    ARMOUR THYROID 30 mg Tab Take 30 mg by mouth once daily.  0    cetirizine (ZYRTEC) 10 MG tablet Take 1 tablet (10 mg total) by mouth once daily. 90 tablet 11    cyanocobalamin, vitamin B-12, 1,000 mcg/mL Kit Inject 1,000 mcg as directed 3 (three) times a week. (Patient taking differently: Inject 1,000 mcg as directed every Mon, Wed, Fri. ) 12 kit 5    duloxetine (CYMBALTA) 30 MG capsule Take 1 capsule (30 mg total) by mouth once daily. 30 capsule 11    glycerin adult suppository Place 1 suppository rectally as needed for Constipation. 10 suppository 6    ibuprofen (ADVIL,MOTRIN) 800 MG tablet Take 800 mg by mouth 3 (three) times daily as needed for Pain.      linaclotide (LINZESS) 145 mcg Cap capsule Take 1 capsule (145 mcg total) by mouth once daily. 30 capsule 0    lisinopril (PRINIVIL,ZESTRIL) 40 MG tablet Take 1 tablet (40 mg total) by mouth once daily. 90 tablet 11    magnesium 30 mg Tab Take 1 tablet by mouth once daily.       multivitamin with iron-mineral Liqd Take 15 mLs by mouth once daily.      NYSTATIN (DUKE'S SOLUTION) Take 10 mLs by mouth 4 (four) times daily.      rivaroxaban (XARELTO) 10 mg Tab Take 1 tablet (10 mg total) by mouth daily with dinner or evening meal. 30 tablet 11    senna-docusate 8.6-50 mg (PERICOLACE) 8.6-50 mg per tablet Take 1 tablet by mouth 2 (two) times daily.      simethicone (MYLICON) 125 mg Cap capsule Take 1 capsule (125 mg total) by mouth 4 (four) times daily as needed for Flatulence.  0    tramadol (ULTRAM) 50 mg tablet TAKE ONE TABLET BY MOUTH THREE TIMES DAILY AS NEEDED FOR PAIN 90 tablet 0    TURMERIC (CURCUMIN MISC) Take 1 tablet by mouth once daily.       zolpidem (AMBIEN) 5 MG Tab TAKE ONE TABLET BY MOUTH AT BEDTIME 30 tablet 1     No current facility-administered medications for this  visit.                   REVIEW OF SYSTEMS:   Pulmonary related symptoms as per HPI.  Gen: no weight loss, no fever, no night sweats  HEENT: mild sinus congestion, + dysphagia, + hoarseness  CV: no chest pain, no orthopnea, no paroxysmal nocturnal dyspnea  Neuro: no syncope, no falls, not walking  PHYSICAL EXAM:   RR 18  GENERAL: Alert, calm  HEENT: Normal pupils, normal conjunctiva, normal EOM, nasal and oral mucosa normal, tongue +facic, mild coating  NECK: supple; no palpable lymphadenopathy or masses,no jugular venous distention.  CVS: regular rate and rhythm, no murmers, gallops or rubs  PULM: Grossly decreased vital capacity, normal to percussion and palpation, clear to auscultation bilaterally with no wheezes, crackles or rhonchi, + accessory muscle usage.  ABDOMEN: soft nontender/nondistended,  rise with inspiration, decreased contraction with cough  EXTREMITIES no cyanosis, clubbing, +bilat LE edema    CONTRIBUTORY STUDIES:     PULMONARY FUNCTION TESTS: FVC 44%   l/m, o2 sat 96%        ACTIVE PULMONARY PROBLEMS:    ICD-10-CM ICD-9-CM    1. ALS (amyotrophic lateral sclerosis) G12.21 335.20    2. Impaired mobility and activities of daily living Z74.09 799.89    3. Chronic midline low back pain without sciatica M54.5 724.2     G89.29 338.29    4. Scoliosis of thoracic spine, unspecified scoliosis type M41.9 737.30        ASSESSMENT&PLAN:  1.  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  Anticipate nearly continuous use in near future if tolerates- BIPAP is not sufficient.  Needs advanced mode, battery backup, alarms.  Family discussing use of NIV in assisted living with administration.  I provided my direct number to help. Will visit Assisted Living to provide guidance.  2.  Increased risk for venous thromboembolic dz / not currently treating  3.  Pt does not favor PEG tube, and may not be allowed at assisted living- moving  would be another major burden and is not planned.  4.  Ms. Edmonds wishes to discuss hospice enrollment with [facility] again.      No Follow-up on file.      [Greater than 50% of this 30 minute visit spent counseling patient and family]

## 2017-08-29 ENCOUNTER — DOCUMENTATION ONLY (OUTPATIENT)
Dept: NEUROLOGY | Facility: CLINIC | Age: 82
End: 2017-08-29

## 2017-08-29 DIAGNOSIS — G12.21 ALS (AMYOTROPHIC LATERAL SCLEROSIS): Primary | ICD-10-CM

## 2017-08-29 NOTE — PROGRESS NOTES
Pt was admitted to [facility] last night. Order for hospice and paperwork for brain and spinal cord donation has been faxed to [facility] as well. Protocol for donation will additionally be scanned to pt's chart and sent to [facility] at [facility].

## 2017-08-31 DIAGNOSIS — E55.9 VITAMIN D DEFICIENCY: ICD-10-CM

## 2017-08-31 DIAGNOSIS — M79.605 BILATERAL LEG PAIN: ICD-10-CM

## 2017-08-31 DIAGNOSIS — G12.21 ALS (AMYOTROPHIC LATERAL SCLEROSIS): ICD-10-CM

## 2017-08-31 DIAGNOSIS — F41.9 ANXIETY: ICD-10-CM

## 2017-08-31 DIAGNOSIS — M79.604 BILATERAL LEG PAIN: ICD-10-CM

## 2017-08-31 RX ORDER — ALPRAZOLAM 0.25 MG/1
TABLET ORAL
Qty: 90 TABLET | Refills: 0 | Status: SHIPPED | OUTPATIENT
Start: 2017-08-31

## 2021-11-04 NOTE — SUBJECTIVE & OBJECTIVE
Bed: H22  Expected date: 11/4/21  Expected time: 4:49 PM  Means of arrival:   Comments:  OL Fall head pain    Past Medical History:   Diagnosis Date    ALS (amyotrophic lateral sclerosis)     Breast cancer 2011    Breast cancer - lumpectomy, radiation, no chemo; L    Hypertension     Hypothyroid        Past Surgical History:   Procedure Laterality Date    SPINE SURGERY  2015    L4-5 laminectomy       Review of patient's allergies indicates:  No Known Allergies    No current facility-administered medications on file prior to encounter.      Current Outpatient Prescriptions on File Prior to Encounter   Medication Sig    alprazolam (XANAX) 0.25 MG tablet Take 1 tablet (0.25 mg total) by mouth 3 (three) times daily.    ARMOUR THYROID 30 mg Tab Take 30 mg by mouth once daily.    cetirizine (ZYRTEC) 10 MG tablet Take 1 tablet (10 mg total) by mouth once daily.    cyanocobalamin, vitamin B-12, 1,000 mcg/mL Kit Inject 1,000 mcg as directed 3 (three) times a week. (Patient taking differently: Inject 1,000 mcg as directed every Mon, Wed, Fri. )    fluticasone (FLONASE) 50 mcg/actuation nasal spray 2 sprays by Each Nare route once daily.    glycerin adult suppository Place 1 suppository rectally as needed for Constipation.    lisinopril (PRINIVIL,ZESTRIL) 40 MG tablet Take 1 tablet (40 mg total) by mouth once daily.    magnesium 30 mg Tab Take 1 tablet by mouth once daily.     tramadol (ULTRAM) 50 mg tablet Take 1 tablet (50 mg total) by mouth 3 (three) times daily.    triamterene-hydrochlorothiazide 37.5-25 mg (MAXZIDE-25) 37.5-25 mg per tablet Take 1 tablet by mouth once daily.    TURMERIC (CURCUMIN MISC) Take 1 tablet by mouth once daily.     zolpidem (AMBIEN) 5 MG Tab Take 1 tablet (5 mg total) by mouth nightly as needed. (Patient taking differently: Take 5 mg by mouth nightly as needed (for sleep). )    [DISCONTINUED] montelukast (SINGULAIR) 10 mg tablet Take 1 tablet (10 mg total) by mouth after dinner.    [DISCONTINUED] multivitamin capsule Take 4 capsules by mouth once daily.     Family History     Problem  Relation (Age of Onset)    Cancer Mother, Father, Sister, Daughter        Social History Main Topics    Smoking status: Former Smoker     Years: 5.00    Smokeless tobacco: Not on file    Alcohol use 8.4 oz/week     14 Glasses of wine per week      Comment: 1 glass per day    Drug use: No    Sexual activity: Not Currently     Review of Systems   Constitutional: Positive for appetite change and fatigue. Negative for activity change, diaphoresis and fever.   HENT: Positive for congestion, rhinorrhea and sore throat.    Respiratory: Positive for cough and shortness of breath. Negative for chest tightness, wheezing and stridor.    Cardiovascular: Negative for chest pain, palpitations and leg swelling.   Gastrointestinal: Positive for abdominal distention and constipation. Negative for diarrhea, nausea and vomiting.   Genitourinary: Negative for dysuria.   Musculoskeletal: Positive for arthralgias and back pain.   Skin: Negative for color change.   Neurological: Positive for weakness. Negative for dizziness, speech difficulty and light-headedness.   Psychiatric/Behavioral: Positive for confusion.     Objective:     Vital Signs (Most Recent):  Temp: 98.3 °F (36.8 °C) (06/13/17 1212)  Pulse: 79 (06/13/17 1212)  Resp: 18 (06/13/17 1212)  BP: (!) 183/98 (06/13/17 1212)  SpO2: 98 % (06/13/17 1212) Vital Signs (24h Range):  Temp:  [97.6 °F (36.4 °C)-98.6 °F (37 °C)] 98.3 °F (36.8 °C)  Pulse:  [78-95] 79  Resp:  [18] 18  SpO2:  [96 %-100 %] 98 %  BP: (126-198)/(60-98) 183/98     Weight: 52.6 kg (116 lb)  Body mass index is 19.01 kg/m².    Physical Exam   Constitutional: She is oriented to person, place, and time. She appears well-developed and well-nourished. No distress.   HENT:   Head: Normocephalic and atraumatic.   Eyes: EOM are normal. Pupils are equal, round, and reactive to light.   Neck: Normal range of motion. No JVD present.   Cardiovascular: Normal rate, regular rhythm and normal heart sounds.    Pulmonary/Chest:  Effort normal and breath sounds normal. No respiratory distress. She has no wheezes.   Abdominal: Soft. Bowel sounds are normal. She exhibits no distension. There is no tenderness.   Musculoskeletal:   Paralyzed from waist down; sensation intact.    Neurological: She is alert and oriented to person, place, and time.   Skin: Skin is warm and dry. Capillary refill takes less than 2 seconds. She is not diaphoretic.   Psychiatric: She has a normal mood and affect. Her behavior is normal. Thought content normal.        Significant Labs:      6/12/2017 14:44 6/13/2017 05:15   Sodium 125 (L) 134 (L)   Potassium 5.0 3.0 (L)   Chloride 90 (L) 104   CO2 24 23   Anion Gap 11 7 (L)   BUN, Bld 12 8   Creatinine 0.7 0.5   eGFR if non African American >60.0 >60.0   eGFR if African American >60.0 >60.0   Glucose 99 80   Calcium 9.7 7.5 (L)   Phosphorus 3.6 2.8   Magnesium 2.2 1.1 (L)       Significant Imaging:   Imaging Results          X-Ray Abdomen Flat And Erect (Final result)  Result time 06/12/17 16:44:14    Final result by Jorge Rizvi MD (06/12/17 16:44:14)                 Impression:     See above      Electronically signed by: Jorge Rizvi MD  Date:     06/12/17  Time:    16:44              Narrative:    2 views    No free air in the abdomen.  Slight degree of  diffuse bowel dilatation involving the colon and small bowel is identified.  Thoracolumbar scoliosis.

## 2023-08-08 NOTE — TELEPHONE ENCOUNTER
"Thank you so much for coming in today! I really enjoyed working with you and Kalin. I requested the four appointments we discussed to be scheduled. If we need to make any adjustments, please do not hesitate to reach out. Below are some recommendations and/or resources. Please feel free to reach out if you have further questions or concerns moving forward.       Have a great rest of your day!      Danita Wagoner, Ph.D.  Licensed Psychologist - [place] #[id number], [place] #[id number], [place] #    [facility]   [facility] [address]  [place] [address]  Office: [phone]  Fax: [phone]     Parenting Anxious Youth: 101    THE PUSHER    "You just need to go. Were going now, and you have to go with us. You used to go all the time, you can go now."    Why you do it:  Because youve seen your child become more isolated from the world, and youre concerned. Youve seen your child hold back from doing things, either from things she has done before or from things that her siblings and friends are doing. You feel that, if you could just get her to go, she would enjoy it once she got there and realize that its not as scary as she thinks.    Whats good about it:  Ultimately, we do want your child to do the things that make her anxious and face her fears.    Why it doesnt work:  It can feel invalidating to the child, as if you do not understand how anxious, upset, or uncomfortable this situation makes her. Also, your child does not, yet, have the skills necessary to handle those anxious, uncomfortable feelings. It is likely that, even if you do succeed in getting her to approach the situation, she will fail, either by panicking or otherwise feeling overwhelmed by the situation. She will then decide that she was right all along--she cant handle the situation, and it is too scary.      THE SOFTY    "Whats the matter, honey? Your heart is racing and " Thanks. Will need to f/u with Dr Lagos about TSH.  DL "you feel sick to your stomach? OK, if going to school (or Johnathons party or soccer tryouts) is this hard, maybe you should just stay home."    Why you do it:  One of the most basic instincts of parenting is to keep your child safe from harm. When a child is upset, hurt, or distraught, we want to fix it, by whatever means necessary. Often parents worry about making their child worse by pushing her, sometimes even stating their concern about traumatizing their child by making her do something that is so obviously distressing.    Whats good about it:  Often, a teen feels as if a parent who accedes to her fears is the one who gets her--the one who understands how real and significant her anxiety and distress truly is.    Why it doesnt work:  Avoidance is a pattern. Once it starts, it is hard to stop. The next time your child gets nervous before an event, she will think that the only tool for handling anxiety is to avoid it. Also, it sets up the idea that anxious feelings are bad, since you are trying to make them stop. Anxiety is a feeling; it is neither good nor bad, it is simply neutral. Just as you would never suggest that your child should never feel sad, angry, or frustrated, you would not want to suggest that she should never feel anxious. Finally, overreacting to the physical symptoms sends the message that these symptoms are dangerous. They are uncomfortable, to be certain, but not dangerous or harmful.      THE ANTICIPATOR    "Oh, boy. We just got this invitation from Aunt Lesly to go to a family reunion. I know that ? hours in the car is just too much for you. Plus, [place] so Im not sure what the facilities will be like. Ill go ahead and decline."    Why you do it:  You know your child and her typical responses to these types of situations. Youve already wasted time and money on unsuccessful ventures, whether they were family trips that had to be cancelled at the last minute or " "parties or other events that had to be left early, in the midst of panic. Rather than risk embarrassment for you and your child, it seems better just not to bother.    Whats good about it:  Similar to The Softy, your child may feel like you truly understand her since you can anticipate her feelings and limitations.    Why it doesnt work:  You are teaching your child that she cant do it and furthering her sense of thats too hard for me to handle. You are modeling that things that make us anxious should be avoided, rather than faced. Also, you are setting up the idea that anxiety is embarrassing. The attitude that wed rather not go only to have to leave detention through suggests that your child should be embarrassed or ashamed of her anxiety problem.    The Importance of a United Front  Often parents differ in their approach to their childs anxiety--one might be The Softy while the other is The Pusher. Teens are smart and savvy: they will  on this discrepancy quickly. Inconsistencies in parental responses can send mixed messages that can be confusing. It is important that whatever disagreements you and your spouse have behind the scenes, your child should think of you as a united front (not only about anxiety, but about all parenting decisions). There is a healthy alternative to these ineffectual approaches:      THE IDEAL    "I know youre not feeling well. This usually happens before a big test. Use the skills youve learned in therapy. I know its hard, but I also know that you can do this. Think about how proud you are going to be of yourself when its all over and youve done it. Lets think of a good reward-- how about going out to dinner tomorrow?"    Push compassionately:  Help your child push through the anxiety, and hold firm to expectations about her following through with the situation. But be equally sure to include empathy for the amount of distress the situation is causing " her.    Focus on competency:  Reiterate (as many times as necessary) that your child has the ability to handle the situation. Help her to focus on the skills needed to complete the task rather than on the anxiety.    Downplay physical feelings:  Express compassion for the physical feelings, but no longer react to your child as you would if she were truly ill (e.g., if she had the stomach flu). Remind her that anxiety is causing the sensation, the sensation is time limited (i.e., it wont last forever, it will end as soon as the anxiety does), and it is not harmful.    Be realistic:  If something is really hard (or perhaps is the first time the child is trying something new), keep the situation manageable in length and intensity, but with the understanding that each time the child tries it, she should push herself to stay a little longer. Some of this may be different from your typical response to situations, and it may feel uncomfortable at first. Also, you may wonder why your other children have responded just fine to your usual interventions. It is important to note that your interventions werent bad parenting; they simply were not the ideal way to handle a child with an anxious temperament. It is important to find a parenting style that fits with your childs temperament--since each child is different, your parenting may have to adjust slightly to best meet each childs needs.         Behavioral Principles for Parenting Anxious Youth    REINFORCEMENT    Positive reinforcement  Positive reinforcement is when a pleasant reward follows a behavior and tends to further encourage that behavior. As a parent, you want to positively reinforce those behaviors that you want to see continue. You also, however, must be careful not to reinforce those behaviors you want to stop. Think of a child having a tantrum in a grocery store: typically, the immediate response of a parent is to get the child to quiet down ASAP. Often  this means leaving the store or giving the child a toy or a piece of candy to quiet down. These behaviors are rewarding for the child; therefore, he is likely to throw a tantrum the next time he gets upset in the grocery store.    You want to positively reinforce the behaviors you like (e.g., approaching anxiety-provoking situations) and be careful not to reinforce the behaviors you dont like (avoiding anxiety). While the obvious examples (like the tantrum) are easy to avoid, parents may often find themselves more subtly reinforcing behaviors that they do not like (e.g., allowing a child whose panic has kept him home from school that day to go out with his friends, thinking Well, at least he is getting out of the house.). An example of using positive reinforcement appropriately is when your child goes to a previously avoided place or situation and you praise him for it, saying something like, Thats fantastic! I am so proud of the way you are learning not to avoid things!    Human Slot Machine  The reason people play the slots is because they believe a chance exists that they might win big. That hope is fostered by the sounds of winning machines all around them and the fact that every once in a while, they, too, win some money. What makes playing the slots so irresistible is that it uses variable reinforcement--you never know if the next pull of the lever is the one that will win you the big money. If sometimes when your teen whines, gets upset, or panics he gets his way and other times he doesnt, you are a human slot machine. By varying in your response, your child learns that if he keeps pushing, maybe the next tactic will be the one that will get the big payoff  (i.e., the outcome he wants).     Even if youve been variable before, if you start being consistent now and continue to be consistent, eventually these behaviors will subside. This doesnt mean you can never be spontaneous or break from  routine, it just means that first you have to create a culture of consistency, and then, when you are spontaneous or break from routine, it has to be on your terms and not on your childs. So, once a consistent pattern has been set regarding curfew, for example, if you decide to extend his curfew because of a special event or as a treat for doing something good that week, this is a great reward and should be offered as such (e.g., I am so proud of you! You worked so hard this week to face your anxiety, going to a mall and staying home by yourself for hours, so I think you deserve an extra hour at curfew on Friday night.). However, extending curfew because you got tired of arguing about it reinforces the idea that your teen should argue with you in the future because eventually you might give up. Every time you give in, you reinforce the behavior of arguing.    Active Ignoring/Picking Your Battles  To avoid reinforcing negative behaviors, it is often advisable to ignore such behavior, unless it breaks a house rule, is dangerous to the teen (or to others), or is potentially harmful in some other way. This is called active ignoring. You see the behavior, you dont approve of the behavior, but if it is not crossing an important line, you choose to ignore it. Thus, you refrain from subtly reinforcing the behavior (perhaps the teen is trying to get you to react), and you also minimize the number of negative interactions you have with your teen over the course of a day.     To further minimize arguments and increase the amount of positive interaction, it may be necessary to alter your expectations and pick your battles. It is also important to pick your battles because, to avoid becoming a human slot machine, you do not want to set a limit or make a rule that you do not intend to enforce consistently.    Praise  All too often, especially with teens, parents fall into a trap of focusing on the negative. When teens are  "doing what they are expected to do (keeping their room clean, using good manners, getting their chores or homework done), little or no reinforcement is given for these behaviors. While this may work with many youth, teens with anxiety already tend to be hard on themselves and focus on the negative. As such, its important to remember to praise them. Everyone likes to be praised, and praising acts as positive reinforcement. Remember, positively reinforced actions are more likely to continue. This goes for routine behaviors (e.g., emptying the ) as well as anxiety-related behaviors (e.g., going to a previously avoided place like the movie theater).    Labeled Praise  When giving your teen a compliment or praising him for something, be as specific as possible. So, when praising, be sure to reinforce the aspect of the behavior that you like (e.g., Your room looks great! What a great job you did straightening up all those books and CDs!) rather than offering more general praise (e.g., Thanks for cleaning your room.).    Thoughtful/Mindful Parenting  The general idea is to think about what your teen is learning from a given situation or interaction. Consider a variety of situations, both anxiety-related and routine. What behaviors are you reinforcing? Is your teen getting rewarded for behaviors you want to continue? Or for behaviors you want to stop? Also, think about what your own behavior is modeling for your teen.  Ask yourself What did my teen just learn from that interaction/situation? or What message am I sending to my teen?       Recommendations for Parenting a Child with ADHD    Helpful resources:  Additude Brock   www.RealTravel.com    Children and Adults with Attention-Deficit/Hyperactivity Disorder (MALCOM):  https://malcom.org/nrc-toolkit/    Understood:  www.understood.org     Smart but Scattered: The Revolutionary "Executive Skills" Approach to Helping Kids Reach Their Potential by Peg " Elder (Child and Teen Versions)  https://www.[employer]/Smart-but-Scattered-Revolutionary-Executive/dp/6406291626           What you can do:  Educate yourself about ADHD. Check out the following website for information: https://childmind.org/guide/what-parents-should-know-about-adhd/   Have your child evaluated. This is often called testing or a neuropsychological evaluation. Public schools and psychologists can conduct comprehensive evaluations to determine strengths and weaknesses in thinking skills, academics, learning, memory, and attention.    Establish an IEP or 504 Plan ([facility] schools only). After you receive the report from your child's evaluation, request a meeting with your child's school to establish educational accommodations. These accommodations can help support your child tremendously in school and set them up for success.  Maintain communication with teachers. Encourage your child's teachers to regularly inform you about what is going well and what still needs improvement.   Discuss medication with your child's physician. Medication can be very helpful for helping your child focus, typically with minimal side effects. The most commonly reported side effects include decreased appetite and difficulty sleeping, both of which tend to improve with time.   Use behavioral strategies. Medication will improve your child's concentration, but will not change their habits at home or school. It is important to implement behavioral strategies and build your child's toolbox of skills to help them stay organized, motivated, and in control of their ADHD.    Tips for helping your child with ADHD stay focused and organized:    Follow a routine. It is important to set a time and a place for everything to help the child with ADHD understand and meet expectations. Establish simple and predictable rituals for meals, homework, play, and bed. Have your child lay out clothes for the next morning before going to bed, and  make sure whatever he or she needs to take to school is in a special place, ready to grab.    Use clocks and timers. Consider placing clocks throughout the house, with a big one in your child's bedroom. Allow enough time for what your child needs to do, such as homework or getting ready in the morning. Use a timer for homework or transitional times, such as between finishing up play and getting ready for bed.    Simplify your child's schedule. It is good to avoid idle time, but a child with ADHD may become more distracted and wound up if there are many after-school activities. You may need to make adjustments to the child's after-school commitments based on the individual child's abilities and the demands of particular activities.    Create a quiet work space. Children with ADHD benefit from having a quiet, well-lit area with low stimulation and few distractions established as a work area at home. This area should only be used for tasks such as homework, studying, learning, or other activities that require concentration and attention. During such activities, efforts should be made to reduce distractions, such as loud music or television noise. It may be helpful for your child to develop a system they can use to organize their learning materials and establish a set time and place to study. They should also study more difficult subjects when their energy levels are highest and build in study breaks.    Do your best to be neat and organized. Set up your home in an organized way. Make sure your child knows that everything has its place. Lead by example with neatness and organization as much as possible. Individuals with ADHD will require close monitoring, as well as help with organization and planning, in order to complete assignments. Accommodations include having teachers make sure that your child writes down assignments in their agenda book and has the appropriate books and supplies to take home in order to  complete assignments.     Decrease television time and increase your child's activities and exercise levels during the day.     Eliminate caffeine and reduce sugar from your child's diet.    Create a buffer time to lower down the activity level for an hour or so before bedtime. Find quieter activities such as coloring, reading or playing quietly.    Build self-esteem. Your child should be rewarded more often for when they are doing the required tasks than punished when are not. Discipline and praise should be done privately, not in front of other peers or classmates. It is also important to identify your child's strengths, abilities, and passions, and encourage those qualities in order to build self-esteem and promote a positive experience at home and at school.       Organizational Strategies for ADHD in Young Adults and Teens    ADHD appropriate organizational systems place EFFICIENCY above other values such as aesthetics, preparedness, frugality, or hypervigilance.    Efficiency-  This should be the paramount objective in creating an organizational system. Patients with ADHD tend to be starters not finishers. They find chores not just tedious but torturous. Efficiency combats tedious chores by reducing the number of steps in each task. When organizing for efficiency, rely on a  system that requires the least amount of  work and the fewest number of steps.  Tasks are completed quickly with little effort and minimal focus  Not all organization is good ADHD  organization. Many systems put other values before efficiency   Reduction-  The smallest inventory (the fewest number of possessions) is easiest to manage. Fewer possessions are easier to keep track of and are easier to store.  Furthermore, reduction encourages good  maintenance. Purge your home of overstock  Do not have enough inventory to fill  your storage space  You will run the  when the  is fulI if there are no more dishes in the  cabinets   Resourcefulness-  is more realistic than preparedness. Resourcefulness plays to your creative strengths. You may be easily overwhelmed by overstock (which becomes clutter). Doing without is actually easier and can improve your quality of life. When stock is reduced, you will not have a tool for every job. But, you will be resourceful with the few tools at hand and comfortable managing without excess  For example, with only 4 cook pans and no wok, you will resourcefully stir gregg with the skillet.   Reject lesser values.   Aesthetics, frugality, hypervigilance, perfectionism, and preparedness can sabotage the ADHD home, by placing beauty, money, and effort before efficiency.   Focus on efficiency. Attractive storage systems that rely on standardization (rewriting all recipes on matching cards), saving pennies through effort (coupons), hypervigilance against identity theft (shredding all paperwork), and perfectionism (label makers) require too much time/effort   Structure.   Boundaries and routine promote  efficiency and control impulsivity.   Set boundaries. For example, keep all dishes in the kitchen  Establish routines. For example, grocery lists aid memory and combat impulsivity (impulse purchases)   Support.   ADHD is a medical condition.  Patients with ADHD may require  organizational support and should not  hesitate to reach out for assistance. Luxuries for the general population are often necessities for patients with ADHD. If your financial situation permits it, a lawn service, , , or  should be considered therapeutic tools